# Patient Record
Sex: MALE
[De-identification: names, ages, dates, MRNs, and addresses within clinical notes are randomized per-mention and may not be internally consistent; named-entity substitution may affect disease eponyms.]

---

## 2019-01-13 ENCOUNTER — HOSPITAL ENCOUNTER (INPATIENT)
Dept: HOSPITAL 31 - C.ER | Age: 59
LOS: 12 days | Discharge: NURSING, REHAB OR LONG-TERM CARE | DRG: 550 | End: 2019-01-25
Attending: INTERNAL MEDICINE | Admitting: INTERNAL MEDICINE
Payer: MEDICAID

## 2019-01-13 DIAGNOSIS — E11.22: ICD-10-CM

## 2019-01-13 DIAGNOSIS — E87.2: ICD-10-CM

## 2019-01-13 DIAGNOSIS — N18.6: ICD-10-CM

## 2019-01-13 DIAGNOSIS — I16.1: ICD-10-CM

## 2019-01-13 DIAGNOSIS — E87.5: ICD-10-CM

## 2019-01-13 DIAGNOSIS — I13.2: Primary | ICD-10-CM

## 2019-01-13 DIAGNOSIS — I50.43: ICD-10-CM

## 2019-01-13 DIAGNOSIS — Z99.2: ICD-10-CM

## 2019-01-13 DIAGNOSIS — E66.01: ICD-10-CM

## 2019-01-13 DIAGNOSIS — N17.9: ICD-10-CM

## 2019-01-13 DIAGNOSIS — Z79.4: ICD-10-CM

## 2019-01-13 LAB
ALBUMIN SERPL-MCNC: 3.7 G/DL (ref 3.5–5)
ALBUMIN/GLOB SERPL: 1 {RATIO} (ref 1–2.1)
ALT SERPL-CCNC: 30 U/L (ref 21–72)
AST SERPL-CCNC: 66 U/L (ref 17–59)
BASOPHILS # BLD AUTO: 0 K/UL (ref 0–0.2)
BASOPHILS NFR BLD: 0.7 % (ref 0–2)
BUN SERPL-MCNC: 42 MG/DL (ref 9–20)
CALCIUM SERPL-MCNC: 8.2 MG/DL (ref 8.6–10.4)
CK MB SERPL-MCNC: 15.2 NG/ML (ref 0–3.38)
EOSINOPHIL # BLD AUTO: 0.1 K/UL (ref 0–0.7)
EOSINOPHIL NFR BLD: 1 % (ref 0–4)
ERYTHROCYTE [DISTWIDTH] IN BLOOD BY AUTOMATED COUNT: 14.5 % (ref 11.5–14.5)
GFR NON-AFRICAN AMERICAN: 11
HGB BLD-MCNC: 12.7 G/DL (ref 12–18)
LYMPHOCYTES # BLD AUTO: 0.8 K/UL (ref 1–4.3)
LYMPHOCYTES NFR BLD AUTO: 12.2 % (ref 20–40)
MCH RBC QN AUTO: 30.9 PG (ref 27–31)
MCHC RBC AUTO-ENTMCNC: 33.4 G/DL (ref 33–37)
MCV RBC AUTO: 92.4 FL (ref 80–94)
MONOCYTES # BLD: 0.5 K/UL (ref 0–0.8)
MONOCYTES NFR BLD: 8.3 % (ref 0–10)
NEUTROPHILS # BLD: 5 K/UL (ref 1.8–7)
NEUTROPHILS NFR BLD AUTO: 77.8 % (ref 50–75)
NRBC BLD AUTO-RTO: 0 % (ref 0–2)
PLATELET # BLD: 320 K/UL (ref 130–400)
PMV BLD AUTO: 7.9 FL (ref 7.2–11.7)
RBC # BLD AUTO: 4.12 MIL/UL (ref 4.4–5.9)
TROPONIN I SERPL-MCNC: 0.23 NG/ML (ref 0–0.12)
TROPONIN I SERPL-MCNC: 0.24 NG/ML (ref 0–0.12)
WBC # BLD AUTO: 6.4 K/UL (ref 4.8–10.8)

## 2019-01-13 RX ADMIN — INSULIN ASPART SCH: 100 INJECTION, SOLUTION INTRAVENOUS; SUBCUTANEOUS at 17:00

## 2019-01-13 RX ADMIN — INSULIN ASPART SCH: 100 INJECTION, SOLUTION INTRAVENOUS; SUBCUTANEOUS at 21:51

## 2019-01-13 NOTE — RAD
Date of service: 



01/13/2019



PROCEDURE:  CHEST RADIOGRAPH, 1 VIEW



HISTORY:

SOB



COMPARISON:

02/09/2016



FINDINGS:



LUNGS:





The lungs are well inflated and clear.  There is mild pulmonary 

venous congestion.  No focal consolidation. 



PLEURA:

Small right pleural effusion.  No large left pleural effusion.  No 

pneumothorax. 



CARDIOVASCULAR:

Moderate cardiomegaly.  No aortic atherosclerotic calcifications 

present. 



OSSEOUS STRUCTURES:

Within normal limits for the patient's age.



VISUALIZED UPPER ABDOMEN:

Normal.



OTHER FINDINGS:

None. 



IMPRESSION:

Small right pleural effusion.  Underlying airspace disease cannot be 

excluded.



Moderate cardiomegaly and mild pulmonary venous congestion.

## 2019-01-13 NOTE — C.PDOC
History Of Present Illness





58 year old male presents to the ED for evaluation of mild cough and shortness 

of breath for 3 days. The patient reports current symptoms are exacerbated when 

lying down flat or ambulating. Denies fever, chills, chest pain, and any other 

associated symptoms.  





Chief Complaint (Nursing): Shortness Of Breath


History Per: Patient


History/Exam Limitations: no limitations


Onset/Duration Of Symptoms: Days (x3)


Current Symptoms Are (Timing): Still Present


Associated Symptoms: denies: Fever, Chills


Recent travel outside of the [place]: No





Past Medical History


Reviewed: Historical Data, Nursing Documentation, Vital Signs


Vital Signs: 





                                Last Vital Signs











Temp  97.5 F L  19 11:47


 


Pulse  104 H  19 11:47


 


Resp  24   19 11:47


 


BP  207/118 H  19 11:47


 


Pulse Ox  96   19 11:47














- Medical History


PMH: Bronchitis, HTN


Family History: States: Unknown Family Hx





- Social History


Hx Alcohol Use: No


Hx Substance Use: No





- Immunization History


Hx Tetanus Toxoid Vaccination: No





Review Of Systems


Except As Marked, All Systems Reviewed And Found Negative.


Constitutional: Negative for: Fever, Chills


Eyes: Negative for: Vision Change


Cardiovascular: Negative for: Chest Pain, Palpitations


Respiratory: Positive for: Cough (mild. ), Shortness of Breath, Pleuritic Pain


Gastrointestinal: Negative for: Nausea, Vomiting


Musculoskeletal: Positive for: Other (Lower leg swelling)


Skin: Negative for: Rash


Neurological: Negative for: Weakness, Numbness, Headache





Physical Exam





- Physical Exam


Appears: Non-toxic, No Acute Distress


Skin: Normal Color, Warm, Dry


Head: Atraumatic, Normacephalic


Eye(s): bilateral: Normal Inspection


Oral Mucosa: Moist


Neck: Normal ROM, Supple


Chest: Symmetrical, No Deformity


Cardiovascular: Rhythm Regular, No Murmur


Respiratory: Normal Breath Sounds, No Rales, No Rhonchi, No Wheezing


Gastrointestinal/Abdominal: Normal Exam, Soft, No Tenderness


Extremity: Bilateral: Atraumatic, Normal Color And Temperature, Normal ROM, 

Other (2+ edema lower extremities.)


Neurological/Psych: Oriented x3, Normal Speech, Normal Cognition





ED Course And Treatment





- Laboratory Results


Result Diagrams: 


                                 19 12:13





                                 19 12:13


ECG: Interpreted By Me, Viewed By Me


ECG Rhythm: Sinus Rhythm


Interpretation Of ECG: borderline prolonged QT, no ST elevation


Rate From EC (bpm)


O2 Sat by Pulse Oximetry: 96 (RA)


Pulse Ox Interpretation: Normal





- Other Rad


  ** CXR


X-Ray: Viewed By Me, Read By Radiologist


Interpretation: FINDINGS:  LUNGS:  The lungs are well inflated and clear.  There

is mild pulmonary venous congestion.  No focal consolidation.  PLEURA:  Small 

right pleural effusion.  No large left pleural effusion.  No pneumothorax.  

CARDIOVASCULAR:  Moderate cardiomegaly.  No aortic atherosclerotic 

calcifications present.  OSSEOUS STRUCTURES:  Within normal limits for the pa

tient's age.  VISUALIZED UPPER ABDOMEN:  Normal.  OTHER FINDINGS:  None.  

IMPRESSION:  Small right pleural effusion.  Underlying airspace disease cannot 

be excluded.  Moderate cardiomegaly and mild pulmonary venous congestion.





Critical Care Time





- Critical Care Note


Total Time (in mins): 32


Documented critical care: time excludes all time spent performing seperately 

billable procedures.





Medical Decision Making


Medical Decision Making: 





Initial Plan: 


-Blood sent. 


-CXR 


-EKG 


-Lasix 


-Nitro-Bid -2% 





Imaging and labs reviewed.  +trop 0.2250, + [place] [address].





13:30


Case discussed with Dr. Avila, patient accepted for admission. 





Disposition


Discussed With : Kadeem Avila


Doctor Will See Patient In The: Hospital


Counseled Patient/Family Regarding: Studies Performed, Diagnosis





- Disposition


Disposition: HOSPITALIZED


Disposition Time: 13:30


Condition: GUARDED


Forms:  [facility] (English)





- Clinical Impression


Clinical Impression: 


 Respiratory distress, Heart failure, Hypertensive emergency








- Scribe Statement


The provider has reviewed the documentation as recorded by the Scribe (Kristine Maya)


Provider Attestation: 





All medical record entries made by the Scribe were at my direction and 

personally dictated by me. I have reviewed the chart and agree that the record 

accurately reflects my personal performance of the history, physical exam, 

medical decision making, and the department course for this patient. I have also

personally directed, reviewed, and agree with the discharge instructions and 

disposition.

## 2019-01-13 NOTE — CP.PCM.HP
Past Patient History





- Infectious Disease


Hx of Infectious Diseases: None





- Past Social History


Smoking Status: Never Smoked





- CARDIAC


Hx Hypertension: Yes





- PULMONARY


Hx Bronchitis: Yes





- ENDOCRINE/METABOLIC


Hx Diabetes Mellitus Type 1: Yes





- MUSCULOSKELETAL/RHEUMATOLOGICAL


Hx Falls: No (patient reported)





- PSYCHIATRIC


Hx Substance Use: No





- SURGICAL HISTORY


Hx Surgeries: No





- ANESTHESIA


Hx Anesthesia: No





Meds


Allergies/Adverse Reactions: 


                                    Allergies











Allergy/AdvReac Type Severity Reaction Status Date / Time


 


Sulfa (Sulfonamide AdvReac   Verified 01/13/19 11:52





Antibiotics)     














Results





- Vital Signs


Recent Vital Signs: 





                                Last Vital Signs











Temp  97.5 F L  01/13/19 11:47


 


Pulse  75   01/13/19 14:53


 


Resp  19   01/13/19 14:53


 


BP  168/102 H  01/13/19 14:53


 


Pulse Ox  98   01/13/19 14:53














- Labs


Result Diagrams: 


                                 01/13/19 12:13





                                 01/13/19 12:13


Labs: 





                         Laboratory Results - last 24 hr











  01/13/19 01/13/19





  12:13 12:13


 


WBC  6.4  D 


 


RBC  4.12 L 


 


Hgb  12.7  D 


 


Hct  38.1 


 


MCV  92.4  D 


 


MCH  30.9 


 


MCHC  33.4 


 


RDW  14.5 


 


Plt Count  320 


 


MPV  7.9 


 


Neut % (Auto)  77.8 H 


 


Lymph % (Auto)  12.2 L 


 


Mono % (Auto)  8.3 


 


Eos % (Auto)  1.0 


 


Baso % (Auto)  0.7 


 


Neut # (Auto)  5.0 


 


Lymph # (Auto)  0.8 L 


 


Mono # (Auto)  0.5 


 


Eos # (Auto)  0.1 


 


Baso # (Auto)  0.0 


 


Sodium   138


 


Potassium   5.7 H


 


Chloride   111 H


 


Carbon Dioxide   17 L


 


Anion Gap   16


 


BUN   42 H


 


Creatinine   5.2 H


 


Est GFR ( Amer)   14


 


Est GFR (Non-Af Amer)   11


 


Random Glucose   116 H D


 


Calcium   8.2 L


 


Total Bilirubin   1.0


 


AST   66 H


 


ALT   30


 


Alkaline Phosphatase   96


 


Troponin I   0.2250 H*


 


NT-Pro-B Natriuret Pep   27331 H


 


Total Protein   7.5


 


Albumin   3.7


 


Globulin   3.8


 


Albumin/Globulin Ratio   1.0

## 2019-01-14 LAB
BUN SERPL-MCNC: 51 MG/DL (ref 9–20)
C3 SERPL-MCNC: 80 MG/DL (ref 88–165)
C4 SERPL-MCNC: 40.7 MG/DL (ref 14–44)
CALCIUM SERPL-MCNC: 8.1 MG/DL (ref 8.6–10.4)
CK MB SERPL-MCNC: 15.3 NG/ML (ref 0–3.38)
GFR NON-AFRICAN AMERICAN: 11
HEPATITIS B CORE AB: NEGATIVE
HEPATITIS C ANTIBODY: NEGATIVE
TROPONIN I SERPL-MCNC: 0.25 NG/ML (ref 0–0.12)

## 2019-01-14 RX ADMIN — INSULIN ASPART SCH: 100 INJECTION, SOLUTION INTRAVENOUS; SUBCUTANEOUS at 11:32

## 2019-01-14 RX ADMIN — INSULIN ASPART SCH: 100 INJECTION, SOLUTION INTRAVENOUS; SUBCUTANEOUS at 07:16

## 2019-01-14 RX ADMIN — INSULIN ASPART SCH: 100 INJECTION, SOLUTION INTRAVENOUS; SUBCUTANEOUS at 21:10

## 2019-01-14 RX ADMIN — INSULIN ASPART SCH: 100 INJECTION, SOLUTION INTRAVENOUS; SUBCUTANEOUS at 16:30

## 2019-01-14 NOTE — CP.PCM.PN
Subjective





- Date & Time of Evaluation


Date of Evaluation: 01/14/19


Time of Evaluation: 17:57





Objective





- Vital Signs/Intake and Output


Vital Signs (last 24 hours): 


                                        











Temp Pulse Resp BP Pulse Ox


 


 97.7 F   68   20   155/83 H  98 


 


 01/14/19 15:23  01/14/19 16:51  01/14/19 15:23  01/14/19 15:23  01/14/19 15:23








Intake and Output: 


                                        











 01/14/19 01/14/19





 06:59 18:59


 


Intake Total 115 


 


Balance 115 














- Medications


Medications: 


                               Current Medications





Dextrose (Dextrose 50% Inj)  0 ml IV STAT PRN; Protocol


   PRN Reason: Hypoglycemia Protocol


Dextrose (Glutose 15)  0 gm PO ONCE PRN; Protocol


   PRN Reason: Hypoglycemia Protocol


Furosemide (Lasix)  80 mg IVP BID [facility]


Glucagon (Glucagen Diagnostic Kit)  0 mg IM STAT PRN; Protocol


   PRN Reason: Hypoglycemia Protocol


Heparin Sodium (Porcine) (Heparin)  5,000 units SC Q8 [facility]


   Last Admin: 01/14/19 13:38 Dose:  5,000 units


Hydralazine HCl (Apresoline)  50 mg PO Q6 [facility]


   Last Admin: 01/14/19 11:30 Dose:  50 mg


Dextrose (Dextrose 5% In Water 1000 Ml)  1,000 mls @ 0 mls/hr IV .Q0M PRN; 

Protocol


   PRN Reason: Hypoglycemia Protocol


Insulin Aspart (Novolog)  0 unit SC ACHS [facility]; Protocol


   Last Admin: 01/14/19 11:32 Dose:  Not Given


Lisinopril (Zestril)  30 mg PO DAILY [facility]


Metolazone (Zaroxolyn)  5 mg PO BID [facility]


   Stop: 01/19/19 18:01


Metoprolol Tartrate (Lopressor)  50 mg PO BID [facility]


   Last Admin: 01/14/19 09:57 Dose:  50 mg











- Labs


Labs: 


                                        





                                 01/13/19 12:13 





                                 01/14/19 11:33 











Assessment and Plan


(1) ESRD (end stage renal disease)


Status: Acute   





(2) Heart failure


Status: Acute   





(3) Diabetes


Status: Chronic   





(4) Hypertension


Status: Chronic   





(5) Hypertensive emergency


Status: Acute

## 2019-01-14 NOTE — US
Date of service: 01/14/2019



HISTORY:

BOBO. please assess for PVR as well



COMPARISON:

None available.



TECHNIQUE:

Pelvis ultrasound



FINDINGS:

Prevoid urinary bladder measures 6.8 x 5.5 x 6.3 cm, calculated 

volume 164.7 mL. 



Postvoid residual could not be obtained as patient unable to void 

during the examination. 



Images of the prostate gland were not provided. 



IMPRESSION:

Prevoid urinary bladder measures 6.8 x 5.5 x 6.3 cm, calculated 

volume 164.7 mL. 



Postvoid residual could not be obtained as patient unable to void 

during the examination.

## 2019-01-14 NOTE — US
Date of service: 01/14/2019



PROCEDURE:  Ultrasound of the Kidneys



HISTORY:

BOBO



COMPARISON:

None available.



TECHNIQUE:

Sonogram of the kidneys.



FINDINGS:



RIGHT KIDNEY:

Measures: 10.2 x 5.1 x 5.8 cm. 



Cortical thinning.  No hydronephrosis, obstructing calculus, or renal 

cyst identified. 



LEFT KIDNEY:

Measures: 11.8 x 5.5 x 5.3 cm. 



Cortical thinning.  No hydronephrosis, obstructing calculus, or renal 

cyst identified. 



OTHER FINDINGS:

None.



IMPRESSION:

Bilateral cortical thinning.  No evidence of hydronephrosis, 

obstructing calculus, or renal cyst.

## 2019-01-14 NOTE — CARD
--------------- APPROVED REPORT --------------





Date of service: 01/13/2019



EKG Measurement

Heart [id number]

[place] 152P

[id number]

[id number]

[id number]



<Conclusion>

Normal sinus rhythm

Left axis deviation

Prolonged QT

Abnormal ECG

## 2019-01-14 NOTE — CARD
--------------- APPROVED REPORT --------------





Date of service: 01/14/2019



EXAM: Two-dimensional and M-mode echocardiogram with Doppler and 

color Doppler.



Other Information 

Quality : AverageRhythm : NSR



INDICATION

Congestive Heart Failure 



RISK FACTORS

Hypertension 

Obesity   

Diabetes



2D DIMENSIONS 

IVSd0.9   (0.7-1.1cm)LVDd5.9   (3.9-5.9cm)

PWd1.4   (0.7-1.1cm)LA Kdjoft26   (18-58mL)

LVDs4.5   (2.5-4.0cm)FS (%) 23.2   %

LVEF (%)45.9   (>50%)



Aortic Valve

AoV Peak Xgnvtbhy483.9cm/Beltran Peak GR.11mmHg



Mitral Valve

MV E Jlismmjl42.6cm/sMV A Ripigywl66.8cm/sE/A ratio1.1



TDI

Lateral E' Peak V11.45cm/sMedial E' Peak V7.13cm/sE/Lateral E'6.1

E/Medial E'9.8



Tricuspid Valve

TR Peak Ntygwvjc077ga/sTR Peak Gr.35wtLpBLUE27rbXo



 LEFT VENTRICLE 

The Left Ventricle is mildly dilated. 

There is mild asymmetric left ventricular hypertrophy.

Left ventricle  is mildly impaired.The Ejection Fraction is 45-50%.

There is mild global hypokinesia.

The left ventricular diastolic function is normal.

No left ventricle thrombus noted on this study.



 RIGHT VENTRICLE 

The right ventricle is mildly dilated.

There is normal right ventricular wall thickness.

The right ventricular systolic function is normal.



 ATRIA 

The left atrium is mildly dilated.

The right atrium size is normal.



 AORTIC VALVE 

The aortic valve is mildly thickened.

The aortic valve is trileaflet.

No aortic regurgitation is present.

There is no aortic valvular stenosis.

There is no aortic valvular vegetation.



 MITRAL VALVE 

Mitral annular calcification is mild.

There is no evidence of mitral valve prolapse.

There is no mitral valve stenosis.

Mitral regurgitation is mild.



 TRICUSPID VALVE 

The tricuspid valve is normal in structure.

There is trace to mild tricuspid regurgitation.

Right ventricular systolic pressure is estimated at less than 30 

mmHg. 

There is no pulmonary hypertension.

There is no tricuspid valve prolapse or vegetation.

There is no tricuspid valve stenosis.



 PULMONIC VALVE 

The pulmonic valve is not well visualized.

There is no pulmonic valvular regurgitation.

There is no pulmonic valvular stenosis.



 GREAT VESSELS 

The aortic root is normal in size.

The IVC is normal in size and collapses >50% with inspiration.



 PERICARDIAL EFFUSION 

There is no pericardial effusion.

There is no pleural effusion.



<Conclusion>

The Left Ventricle is mildly dilated.

Left ventricular systolic function  is mildly impaired.The Ejection 

Fraction i -45%.

The left ventricular diastolic function is normal.

There is mild global hypokinesia.

The right ventricle is mildly dilated.

The left atrium is mildly dilated.

The right atrium size is normal.

Mitral regurgitation is mild.

There is trace to mild tricuspid regurgitation.

## 2019-01-14 NOTE — CP.PCM.CON
History of Present Illness





- History of Present Illness


History of Present Illness: 





Patient presents for SOB and COUGH/URI sx's and progressive swelling to legs 

over 3-4 weeks


Patient is a non smoker, no ETOH or drug abuse


He denies any prior hx of cardiac problems


Only remote surgery was amputation of L. foot small toe for infxn many years ago





He is mildly active. mostly indoors, obese


No CP, fevers, chills, N/V, or abdominal pain





In 2016 patient had similar sx's and was dx'd with


   Sputum positive for beta hemolytic strep.


   AFB Negative.


   Cavitary lung lesions were reported/bronchospy was refused and outpatient f/u

was suggested at the time





Review of Systems





- Review of Systems


All systems: reviewed and no additional remarkable complaints except





Past Patient History





- Infectious Disease


Hx of Infectious Diseases: None





- Past Social History


Smoking Status: Never Smoked





- CARDIAC


Hx Hypertension: Yes





- PULMONARY


Hx Bronchitis: Yes





- NEUROLOGICAL


Hx Neurological Disorder: No





- HEENT


Hx HEENT Problems: No





- RENAL


Hx Chronic Kidney Disease: No





- ENDOCRINE/METABOLIC


Hx Diabetes Mellitus Type 1: Yes





- HEMATOLOGICAL/ONCOLOGICAL


Hx Blood Transfusions: No





- INTEGUMENTARY


Hx Dermatological Problems: No





- MUSCULOSKELETAL/RHEUMATOLOGICAL


Hx Falls: No





- GASTROINTESTINAL


Hx Gastrointestinal Disorders: No





- GENITOURINARY/GYNECOLOGICAL


Hx Genitourinary Disorders: No





- PSYCHIATRIC


Hx Substance Use: No





- SURGICAL HISTORY


Hx Surgeries: No





- ANESTHESIA


Hx Anesthesia: No


Hx Anesthesia Reactions: No


Hx Malignant Hyperthermia: No


Has any member of the family had a problem w/ anesthesia?: No





Meds


Allergies/Adverse Reactions: 


                                    Allergies











Allergy/AdvReac Type Severity Reaction Status Date / Time


 


Sulfa (Sulfonamide AdvReac   Verified 01/13/19 11:52





Antibiotics)     














- Medications


Medications: 


                               Current Medications





Dextrose (Dextrose 50% Inj)  0 ml IV STAT PRN; Protocol


   PRN Reason: Hypoglycemia Protocol


Dextrose (Glutose 15)  0 gm PO ONCE PRN; Protocol


   PRN Reason: Hypoglycemia Protocol


Furosemide (Lasix)  40 mg IVP Q12 [facility]


   Last Admin: 01/14/19 09:57 Dose:  40 mg


Glucagon (Glucagen Diagnostic Kit)  0 mg IM STAT PRN; Protocol


   PRN Reason: Hypoglycemia Protocol


Heparin Sodium (Porcine) (Heparin)  5,000 units SC Q8 [facility]


Hydralazine HCl (Apresoline)  50 mg PO Q6 [facility]


Dextrose (Dextrose 5% In Water 1000 Ml)  1,000 mls @ 0 mls/hr IV .Q0M PRN; 

Protocol


   PRN Reason: Hypoglycemia Protocol


Insulin Aspart (Novolog)  0 unit SC ACHS [facility]; Protocol


   Last Admin: 01/14/19 07:16 Dose:  Not Given


Lisinopril (Zestril)  30 mg PO DAILY [facility]


Metoprolol Tartrate (Lopressor)  50 mg PO BID [facility]


   Last Admin: 01/14/19 09:57 Dose:  50 mg











Physical Exam





- Constitutional


Appears: Non-toxic, No Acute Distress





- Head Exam


Head Exam: ATRAUMATIC, NORMAL INSPECTION, NORMOCEPHALIC





- Eye Exam


Eye Exam: EOMI.  absent: Scleral icterus





- ENT Exam


ENT Exam: Mucous Membranes Moist, Normal Exam, Normal Oropharynx





- Neck Exam


Neck exam: Positive for: Normal Inspection.  Negative for: Tenderness, 

Thyromegaly





- Respiratory Exam


Respiratory Exam: Clear to Auscultation Bilateral, NORMAL BREATHING PATTERN.  

absent: Rales, Rhonchi, Wheezes





- Cardiovascular Exam


Cardiovascular Exam: RRR, +S1, +S2.  absent: JVD, +S4, Systolic Murmur





- GI/Abdominal Exam


GI & Abdominal Exam: Normal Bowel Sounds, Soft (obese).  absent: Tenderness





- Extremities Exam


Extremities exam: Negative for: calf tenderness (+ edema in both legs 1-2+ up to

knee)





- Back Exam


Back exam: absent: CVA tenderness (L), CVA tenderness (R)





- Neurological Exam


Neurological exam: Alert, Oriented x3





- Psychiatric Exam


Psychiatric exam: Normal Affect, Normal Mood





- Skin


Skin Exam: Normal Color, Warm





Results





- Vital Signs


Recent Vital Signs: 


                                Last Vital Signs











Temp  97.7 F   01/14/19 07:00


 


Pulse  74   01/14/19 08:00


 


Resp  18   01/14/19 07:00


 


BP  168/83 H  01/14/19 09:57


 


Pulse Ox  99   01/14/19 07:00














- Labs


Result Diagrams: 


                                 01/13/19 12:13





                                 01/14/19 11:33


Labs: 


                         Laboratory Results - last 24 hr











  01/13/19 01/13/19 01/13/19





  12:13 12:13 16:57


 


WBC  6.4  D  


 


RBC  4.12 L  


 


Hgb  12.7  D  


 


Hct  38.1  


 


MCV  92.4  D  


 


MCH  30.9  


 


MCHC  33.4  


 


RDW  14.5  


 


Plt Count  320  


 


MPV  7.9  


 


Neut % (Auto)  77.8 H  


 


Lymph % (Auto)  12.2 L  


 


Mono % (Auto)  8.3  


 


Eos % (Auto)  1.0  


 


Baso % (Auto)  0.7  


 


Neut # (Auto)  5.0  


 


Lymph # (Auto)  0.8 L  


 


Mono # (Auto)  0.5  


 


Eos # (Auto)  0.1  


 


Baso # (Auto)  0.0  


 


Sodium   138 


 


Potassium   5.7 H 


 


Chloride   111 H 


 


Carbon Dioxide   17 L 


 


Anion Gap   16 


 


BUN   42 H 


 


Creatinine   5.2 H 


 


Est GFR ( Amer)   14 


 


Est GFR (Non-Af Amer)   11 


 


POC Glucose (mg/dL)    101


 


Random Glucose   116 H D 


 


Calcium   8.2 L 


 


Total Bilirubin   1.0 


 


AST   66 H 


 


ALT   30 


 


Alkaline Phosphatase   96 


 


Total Creatine Kinase   


 


CK-MB (Mass)   


 


Troponin I   0.2250 H* 


 


NT-Pro-B Natriuret Pep   72746 H 


 


Total Protein   7.5 


 


Albumin   3.7 


 


Globulin   3.8 


 


Albumin/Globulin Ratio   1.0 














  01/13/19 01/13/19 01/14/19





  18:51 21:29 02:00


 


WBC   


 


RBC   


 


Hgb   


 


Hct   


 


MCV   


 


MCH   


 


MCHC   


 


RDW   


 


Plt Count   


 


MPV   


 


Neut % (Auto)   


 


Lymph % (Auto)   


 


Mono % (Auto)   


 


Eos % (Auto)   


 


Baso % (Auto)   


 


Neut # (Auto)   


 


Lymph # (Auto)   


 


Mono # (Auto)   


 


Eos # (Auto)   


 


Baso # (Auto)   


 


Sodium   


 


Potassium   


 


Chloride   


 


Carbon Dioxide   


 


Anion Gap   


 


BUN   


 


Creatinine   


 


Est GFR ( Amer)   


 


Est GFR (Non-Af Amer)   


 


POC Glucose (mg/dL)   95 


 


Random Glucose   


 


Calcium   


 


Total Bilirubin   


 


AST   


 


ALT   


 


Alkaline Phosphatase   


 


Total Creatine Kinase  809 H   807 H


 


CK-MB (Mass)  15.2 H   15.3 H


 


Troponin I  0.2400 H*   0.2480 H*


 


NT-Pro-B Natriuret Pep   


 


Total Protein   


 


Albumin   


 


Globulin   


 


Albumin/Globulin Ratio   














  01/14/19 01/14/19





  02:16 06:54


 


WBC  


 


RBC  


 


Hgb  


 


Hct  


 


MCV  


 


MCH  


 


MCHC  


 


RDW  


 


Plt Count  


 


MPV  


 


Neut % (Auto)  


 


Lymph % (Auto)  


 


Mono % (Auto)  


 


Eos % (Auto)  


 


Baso % (Auto)  


 


Neut # (Auto)  


 


Lymph # (Auto)  


 


Mono # (Auto)  


 


Eos # (Auto)  


 


Baso # (Auto)  


 


Sodium  


 


Potassium  


 


Chloride  


 


Carbon Dioxide  


 


Anion Gap  


 


BUN  


 


Creatinine  


 


Est GFR ( Amer)  


 


Est GFR (Non-Af Amer)  


 


POC Glucose (mg/dL)  82  78


 


Random Glucose  


 


Calcium  


 


Total Bilirubin  


 


AST  


 


ALT  


 


Alkaline Phosphatase  


 


Total Creatine Kinase  


 


CK-MB (Mass)  


 


Troponin I  


 


NT-Pro-B Natriuret Pep  


 


Total Protein  


 


Albumin  


 


Globulin  


 


Albumin/Globulin Ratio  














- EKG Data


EKG Interpreted by: Myself





- Imaging and Cardiology


  ** Chest x-ray


Status: Image reviewed by me





Assessment & Plan





- Assessment and Plan (Free Text)


Assessment: 





EKG: NSR, L. axis, non-specific ST/T changes


CXR: CM, small right pleural effusion, mild congestion


labs: 


   BOBO/CKD (creat 1.6 in 2016 now 5.2) with hyperkalemia


   Trop 0.22 > 0.24 > 0.24


ECHO: 1/14/18: Normal LVEF and wall motion, mild-mod LVH, Grade 2 DD, no 

significant PULM HTN or Valvular abnormalities, no pericardial effusion.





Meds:





HTN: uncontrolled


Diastolic dysfunction, normal systolic function


   Hydralazine HCl (Apresoline)  50 mg PO Q6 [facility] 


   Lisinopril (Zestril)  30 mg PO DAILY [facility]


   Metoprolol Tartrate (Lopressor)  50 mg PO BID [facility]


   may need clonidine or minoxidil in addition if BP remains high





BOBO/CKD/hyperkalemia


   agree with renal eval and w/u: lasic inc to 80 IV BID and metolazone 5 BID 

added


   mild inc troponin is c/w CKD


   f/u renal recc


   I do not suspect ACS


   Monitor and correct lytes

## 2019-01-14 NOTE — CP.PCM.CON
History of Present Illness





- History of Present Illness


History of Present Illness: 


Nephrology Consultation Note:





Assessment: critical


Acute Kidney Injury (N17.9) ? etiology. eval for GN such as ANCA


mild hyerkalemia and metabolic acidosis


fluid overload, possible CHF exacerbation


Diabetic chronic Kidney Disease (E11.22) 


Hypertensive Chronic Kidney Disease (I12.9)


Chronic Kidney Disease (N18.3) Stage 3 with? mg proteinuria (R80.9) likely due 

to HTN


morbid obesity


hx of cavitary lung lesions





Plan


No acute need for renal replacement therapy at this time but may need soon and 

will need close follow up. 


Hypertension control with meds as ordered. Maintain hemodynamics stable. Avoid 

hypotension. Patient not on ACEI/ARB due to recent BOBO


Monitor Input/Output, daily weights and renal function with basic metabolic 

panel


diuresis with IV lasix 80 bid and metolazone 5 mg bid


cardiology pulmonary following





Check urine analysis, spot protein/creatinine, albumin/creatinine ratio, renal 

and bladder sonogram


Check GN work up as C3, C4, TANYA, Anti dsDNA, ANCA (MPO and NC-3), Anti GBM 

antibody, HIV/Hep B and Hep C serology 


serum protein electrophoresis with immunofixation, serum free light chain assay 

(Kappa/Lambda)


Check for 25-OH vitamin D, iPTH, phosphorus level. 





Dose meds/antibiotics for reduced GFR. Avoid fleets enema/magnesium based 

laxatives. Avoid nephrotoxins/NSAIDs/ iodinated contrast (unless needed 

emergently)


Glycemic control


Further work up/management as per primary team





Thanks for allowing me to participate in care of your patient. Will follow 

patient with you. Please call if any Qs. had d/w team


Dr Anton Mcdaniel


Office: [phone] Cell: [phone] Fax: [phone]





Chief Complaint; SOB


Reason for consult: Acute Kidney Injury


HPI: Pt is a 58 M with hx of diabetes Mellitus ( 5years), hypertension (recent 

years) morbid obesity no regular medical follow up presented with complaints of 

worsening SOB on minimal exertion and on lying down. reported leg swelling. 

symptoms only for last few days as per pt. his serum cr 1.5-1.6 in 2016 also 

with cavitary lesion in lung that time. now cr 5 range


Denies OTC/herbal meds or NSAIDs


No recent iodinated contrast exposure. No obvious episodes of low BP. bP high in

200s when came


denies smoking/etoh or drugs





ROS: 


Cardiovascular: No chest pain. 


Pulmonary: c/o shortness of breath 


Gastrointestinal: denies abdominal pain No nausea. No vomiting. 


Genitourinary: No pain while urinating. Denies blood in urine. 


All other negative except as mentioned in HPI





Physical Examination:      


General Appearance: Comfortable, in no acute respiratory distress, co-operative 

. morbid obese


Vitals reviewed and noted as below


Head; Atraumatic, normocephalic


ENT: no ulcers no thrush. Tongue is midline. Oropharynx: no rash or ulcers.


EYES: Pupils are equal, round and reactive to light accommodation. Eye muscles 

and extraocular movement intact. Sclera is anicteric.


Neck; supple no lymphadenopathy, no thyromegaly or bruit


Lungs: Normal respiratory rate/effort. Breath sounds bilateral reduced at bases 

with crackles ++


Heart: Normal rate. s1s2 normal. No rub or gallop. 


Extremities: 3+ edema. No varicose veins


Neurological: Patient is alert, awake and oriented to person, place and time. No

focal deficit. Strength bilateral appropriate and equal


Skin: Warm and dry. Normal turgor. No rash. Palpitation: Normal elasticity for 

age


Abdomen: Abdomen is soft. Bowel sounds +. There is no abdominal tenderness, no 

guarding/rigidity no organomegaly


Psych: normal insight and normal affect/mood


MSK: no joint tenderness or swelling. Digits and nails normal, no deformity


: kidney or bladder not palpable





Labs/imaging reviewed.


Past medical history, past surgical history, family history, social history, 

allergy reviewed and noted as below


Family hx: no hx of CKD. Rest non-contributory 














Past Patient History





- Infectious Disease


Hx of Infectious Diseases: None





- Past Social History


Smoking Status: Never Smoked





- CARDIAC


Hx Hypertension: Yes





- PULMONARY


Hx Bronchitis: Yes





- NEUROLOGICAL


Hx Neurological Disorder: No





- HEENT


Hx HEENT Problems: No





- RENAL


Hx Chronic Kidney Disease: No





- ENDOCRINE/METABOLIC


Hx Diabetes Mellitus Type 1: Yes





- HEMATOLOGICAL/ONCOLOGICAL


Hx Blood Transfusions: No





- INTEGUMENTARY


Hx Dermatological Problems: No





- MUSCULOSKELETAL/RHEUMATOLOGICAL


Hx Falls: No





- GASTROINTESTINAL


Hx Gastrointestinal Disorders: No





- GENITOURINARY/GYNECOLOGICAL


Hx Genitourinary Disorders: No





- PSYCHIATRIC


Hx Substance Use: No





- SURGICAL HISTORY


Hx Surgeries: No





- ANESTHESIA


Hx Anesthesia: No


Hx Anesthesia Reactions: No


Hx Malignant Hyperthermia: No


Has any member of the family had a problem w/ anesthesia?: No





Meds


Allergies/Adverse Reactions: 


                                    Allergies











Allergy/AdvReac Type Severity Reaction Status Date / Time


 


Sulfa (Sulfonamide AdvReac   Verified 01/13/19 11:52





Antibiotics)     














- Medications


Medications: 


                               Current Medications





Dextrose (Dextrose 50% Inj)  0 ml IV STAT PRN; Protocol


   PRN Reason: Hypoglycemia Protocol


Dextrose (Glutose 15)  0 gm PO ONCE PRN; Protocol


   PRN Reason: Hypoglycemia Protocol


Furosemide (Lasix)  40 mg IVP Q12 [facility]


   Last Admin: 01/14/19 09:57 Dose:  40 mg


Glucagon (Glucagen Diagnostic Kit)  0 mg IM STAT PRN; Protocol


   PRN Reason: Hypoglycemia Protocol


Heparin Sodium (Porcine) (Heparin)  5,000 units SC Q8 [facility]


   Last Admin: 01/14/19 13:38 Dose:  5,000 units


Hydralazine HCl (Apresoline)  50 mg PO Q6 [facility]


   Last Admin: 01/14/19 11:30 Dose:  50 mg


Dextrose (Dextrose 5% In Water 1000 Ml)  1,000 mls @ 0 mls/hr IV .Q0M PRN; 

Protocol


   PRN Reason: Hypoglycemia Protocol


Insulin Aspart (Novolog)  0 unit SC ACHS [facility]; Protocol


   Last Admin: 01/14/19 11:32 Dose:  Not Given


Lisinopril (Zestril)  30 mg PO DAILY [facility]


Metoprolol Tartrate (Lopressor)  50 mg PO BID [facility]


   Last Admin: 01/14/19 09:57 Dose:  50 mg











Results





- Vital Signs


Recent Vital Signs: 


                                Last Vital Signs











Temp  97.7 F   01/14/19 07:00


 


Pulse  74   01/14/19 08:00


 


Resp  18   01/14/19 07:00


 


BP  168/83 H  01/14/19 09:57


 


Pulse Ox  99   01/14/19 07:00














- Labs


Result Diagrams: 


                                 01/13/19 12:13





                                 01/14/19 11:33


Labs: 


                         Laboratory Results - last 24 hr











  01/13/19 01/13/19 01/13/19





  16:57 18:51 21:29


 


Sodium   


 


Potassium   


 


Chloride   


 


Carbon Dioxide   


 


Anion Gap   


 


BUN   


 


Creatinine   


 


Est GFR ( Amer)   


 


Est GFR (Non-Af Amer)   


 


POC Glucose (mg/dL)  101   95


 


Random Glucose   


 


Calcium   


 


Total Creatine Kinase   809 H 


 


CK-MB (Mass)   15.2 H 


 


Troponin I   0.2400 H* 














  01/14/19 01/14/19 01/14/19





  02:00 02:16 06:54


 


Sodium   


 


Potassium   


 


Chloride   


 


Carbon Dioxide   


 


Anion Gap   


 


BUN   


 


Creatinine   


 


Est GFR ( Amer)   


 


Est GFR (Non-Af Amer)   


 


POC Glucose (mg/dL)   82  78


 


Random Glucose   


 


Calcium   


 


Total Creatine Kinase  807 H  


 


CK-MB (Mass)  15.3 H  


 


Troponin I  0.2480 H*  














  01/14/19 01/14/19





  11:31 11:33


 


Sodium   139


 


Potassium   4.9


 


Chloride   110 H


 


Carbon Dioxide   21 L


 


Anion Gap   13


 


BUN   51 H


 


Creatinine   5.2 H


 


Est GFR ( Amer)   14


 


Est GFR (Non-Af Amer)   11


 


POC Glucose (mg/dL)  116 H 


 


Random Glucose   99


 


Calcium   8.1 L


 


Total Creatine Kinase  


 


CK-MB (Mass)  


 


Troponin I

## 2019-01-15 LAB
ALBUMIN SERPL-MCNC: 3 G/DL (ref 3.5–5)
ALBUMIN/GLOB SERPL: 1.1 {RATIO} (ref 1–2.1)
ALT SERPL-CCNC: 41 U/L (ref 21–72)
AST SERPL-CCNC: 62 U/L (ref 17–59)
BACTERIA #/AREA URNS HPF: (no result) /[HPF]
BASOPHILS # BLD AUTO: 0.1 K/UL (ref 0–0.2)
BASOPHILS NFR BLD: 0.7 % (ref 0–2)
BILIRUB UR-MCNC: NEGATIVE MG/DL
BUN SERPL-MCNC: 54 MG/DL (ref 9–20)
CALCIUM SERPL-MCNC: 7.8 MG/DL (ref 8.6–10.4)
EOSINOPHIL # BLD AUTO: 0.5 K/UL (ref 0–0.7)
EOSINOPHIL NFR BLD: 7.3 % (ref 0–4)
ERYTHROCYTE [DISTWIDTH] IN BLOOD BY AUTOMATED COUNT: 14.1 % (ref 11.5–14.5)
GFR NON-AFRICAN AMERICAN: 9
GLUCOSE UR STRIP-MCNC: (no result) MG/DL
GRAN CASTS #/AREA URNS LPF: 6 /LPF (ref 0–1)
HGB BLD-MCNC: 10.9 G/DL (ref 12–18)
HYALINE CASTS #/AREA URNS LPF: (no result) /LPF (ref 0–2)
LEUKOCYTE ESTERASE UR-ACNC: (no result) LEU/UL
LYMPHOCYTES # BLD AUTO: 1.7 K/UL (ref 1–4.3)
LYMPHOCYTES NFR BLD AUTO: 24.2 % (ref 20–40)
MCH RBC QN AUTO: 30.1 PG (ref 27–31)
MCHC RBC AUTO-ENTMCNC: 32.3 G/DL (ref 33–37)
MCV RBC AUTO: 93.2 FL (ref 80–94)
MONOCYTES # BLD: 1.1 K/UL (ref 0–0.8)
MONOCYTES NFR BLD: 15.9 % (ref 0–10)
NEUTROPHILS # BLD: 3.6 K/UL (ref 1.8–7)
NEUTROPHILS NFR BLD AUTO: 51.9 % (ref 50–75)
NRBC BLD AUTO-RTO: 0 % (ref 0–2)
PH UR STRIP: 5 [PH] (ref 5–8)
PLATELET # BLD: 281 K/UL (ref 130–400)
PMV BLD AUTO: 7.9 FL (ref 7.2–11.7)
PROT UR STRIP-MCNC: (no result) MG/DL
RBC # BLD AUTO: 3.63 MIL/UL (ref 4.4–5.9)
RBC # UR STRIP: (no result) /UL
SP GR UR STRIP: 1.01 (ref 1–1.03)
SQUAMOUS EPITHIAL: 3 /HPF (ref 0–5)
URINE AMORPHOUS SEDIMENT: (no result) /UL
UROBILINOGEN UR-MCNC: NORMAL MG/DL (ref 0.2–1)
WBC # BLD AUTO: 6.9 K/UL (ref 4.8–10.8)

## 2019-01-15 PROCEDURE — 02HV33Z INSERTION OF INFUSION DEVICE INTO SUPERIOR VENA CAVA, PERCUTANEOUS APPROACH: ICD-10-PCS | Performed by: SURGERY

## 2019-01-15 RX ADMIN — INSULIN ASPART SCH: 100 INJECTION, SOLUTION INTRAVENOUS; SUBCUTANEOUS at 23:00

## 2019-01-15 RX ADMIN — ERGOCALCIFEROL SCH CAP: 1.25 CAPSULE ORAL at 12:18

## 2019-01-15 RX ADMIN — INSULIN ASPART SCH: 100 INJECTION, SOLUTION INTRAVENOUS; SUBCUTANEOUS at 08:00

## 2019-01-15 RX ADMIN — INSULIN ASPART SCH: 100 INJECTION, SOLUTION INTRAVENOUS; SUBCUTANEOUS at 18:04

## 2019-01-15 RX ADMIN — INSULIN ASPART SCH: 100 INJECTION, SOLUTION INTRAVENOUS; SUBCUTANEOUS at 12:23

## 2019-01-15 NOTE — RAD
HISTORY:

 s/p permacath 



COMPARISON:

Chest x-ray performed 1/13/19 



TECHNIQUE:

Chest, one view.



FINDINGS:

Examination limited by habitus.  



Right IJ approach dialysis catheter extends to the right atrium/SVC.



LUNGS:

Mild pulmonary venous congestion. 



PLEURA:

No significant pleural effusion identified. No definite pneumothorax .



CARDIOVASCULAR:

Cardiomegaly.  No significant atherosclerotic calcification present. 



OSSEOUS STRUCTURES:

Degenerative changes.



VISUALIZED UPPER ABDOMEN:

Unremarkable.



OTHER FINDINGS:

None.



IMPRESSION:

Right IJ approach dialysis catheter extends to the right atrium/SVC.



Cardiomegaly.  



Mild pulmonary venous congestion.

## 2019-01-15 NOTE — CP.PCM.PN
Subjective





- Date & Time of Evaluation


Date of Evaluation: 01/15/19


Time of Evaluation: 15:56





Objective





- Vital Signs/Intake and Output


Vital Signs (last 24 hours): 


                                        











Temp Pulse Resp BP Pulse Ox


 


 97.6 F   70   18   158/77 H  99 


 


 01/15/19 07:00  01/15/19 07:00  01/15/19 07:00  01/15/19 10:41  01/15/19 07:00








Intake and Output: 


                                        











 01/15/19 01/15/19





 06:59 18:59


 


Intake Total 580 


 


Output Total 400 


 


Balance 180 














- Medications


Medications: 


                               Current Medications





Dextrose (Dextrose 50% Inj)  0 ml IV STAT PRN; Protocol


   PRN Reason: Hypoglycemia Protocol


Dextrose (Glutose 15)  0 gm PO ONCE PRN; Protocol


   PRN Reason: Hypoglycemia Protocol


Ergocalciferol (Drisdol 50,000 Intl Units Cap)  1 cap PO Q7D [facility]


   Last Admin: 01/15/19 12:18 Dose:  1 cap


Furosemide (Lasix)  80 mg IVP BID [facility]


   Last Admin: 01/15/19 10:41 Dose:  80 mg


Glucagon (Glucagen Diagnostic Kit)  0 mg IM STAT PRN; Protocol


   PRN Reason: Hypoglycemia Protocol


Heparin Sodium (Porcine) (Heparin)  5,000 units SC Q8 [facility]


   Last Admin: 01/15/19 14:25 Dose:  Not Given


Hydralazine HCl (Apresoline)  50 mg PO Q6 [facility]


   Last Admin: 01/15/19 12:18 Dose:  50 mg


Dextrose (Dextrose 5% In Water 1000 Ml)  1,000 mls @ 0 mls/hr IV .Q0M PRN; 

Protocol


   PRN Reason: Hypoglycemia Protocol


Insulin Aspart (Novolog)  0 unit SC ACHS [facility]; Protocol


   Last Admin: 01/15/19 12:23 Dose:  Not Given


Lisinopril (Zestril)  30 mg PO DAILY [facility]


Metolazone (Zaroxolyn)  5 mg PO BID [facility]


   Stop: 01/19/19 18:01


   Last Admin: 01/15/19 10:42 Dose:  5 mg


Metoprolol Tartrate (Lopressor)  50 mg PO BID [facility]


   Last Admin: 01/15/19 10:42 Dose:  50 mg


Morphine Sulfate (Morphine)  1 mg IVP Q10M PRN


   PRN Reason: Pain, moderate (4-7)


   Stop: 01/15/19 17:14


Sevelamer Carbonate (Renvela)  800 mg PO TIDCC [facility]


   Last Admin: 01/15/19 12:21 Dose:  800 mg


Tamsulosin HCl (Flomax)  0.4 mg PO DAILY [facility]


   Last Admin: 01/15/19 12:18 Dose:  0.4 mg


Vitamin B Complex/Vit C/Folic Acid (Nephro-Roxy)  1 tab PO 0800 [facility]











- Labs


Labs: 


                                        





                                 01/15/19 06:36 





                                 01/15/19 06:36 











Assessment and Plan


(1) ESRD (end stage renal disease)


Status: Acute   





(2) Heart failure


Status: Acute   





(3) Diabetes


Status: Chronic   





(4) Hypertension


Status: Chronic   





(5) Hypertensive emergency


Status: Acute

## 2019-01-15 NOTE — PCM.SURG1
Surgeon's Initial Post Op Note





- Surgeon's Notes


Surgeon: Dr. Menchaca


Assistant: Dr. Martínez 


Type of Anesthesia: Local


Pre-Operative Diagnosis: ESRD


Operative Findings: see operative note


Post-Operative Diagnosis: same


Operation Performed: Right IJ permacath insertion


Specimen/Specimens Removed: none


Estimated Blood Loss: EBL {In ML}: 15


Blood Products Given: N/A


Drains Used: No Drains


Post-Op Condition: Good


Date of Surgery/Procedure: 01/15/19


Time of Surgery/Procedure: 16:33

## 2019-01-15 NOTE — CP.PCM.CON
History of Present Illness





- History of Present Illness


History of Present Illness: 





Surgery Consult Note for Dr. Menchaca





CC: SOB


58M w/ PMHx of HTN, Diabetes, CHF, bronchitis presented to ED for worsening of 

SOB. Patient states he has had worsening of SOB since last Wednesday. Patient 

states he also has cold like symptoms including chills (no fevers), runny nose, 

sore throat, and cough. Patient states he is unable to walk around the house as 

he normally was able to. Additionally patient attests to lower extremity edema 

for 2 weeks. Patient denies recent travel. Patient denies chest pain, headaches,

vision changes, abdominal pain, nausea, vomiting. patient does state he has had 

increased urinary frequency but denies dysuria and hematuria. 


Per nephro, patient may need HD as patient remains oliguric despite diuretics 

challenge along with worsening BOBO. Vascular surgery consulted for HD access. 





PMHx: CHF, HTN, Diabetes, bronchitis


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


SocialHx: denies smoking; EtOH


Allergies: Sulfa











Review of Systems





- Constitutional


Constitutional: Chills.  absent: Headache, Night Sweats





- EENT


Eyes: absent: Change in Vision, Diplopia, Discharge


Ears: absent: Ear Discharge, Ear Pain


Nose/Mouth/Throat: Sore Throat.  absent: Nasal Trauma, Dysphagia, Mouth Pain, 

Neck Pain





- Cardiovascular


Cardiovascular: absent: Chest Pain





- Respiratory


Respiratory: Cough, Dyspnea, Dyspnea on Exertion





- Gastrointestinal


Gastrointestinal: absent: Abdominal Pain, Cramping, Diarrhea, Loose Stools





- Genitourinary


Genitourinary: Urinary Frequency.  absent: Flank Pain, Urinary Hesitance, 

Bladder Distension





- Musculoskeletal


Musculoskeletal: absent: Arthralgias, Muscle Weakness, Stiffness





- Integumentary


Integumentary: absent: Lesions, New Lesions





- Neurological


Neurological: absent: Headaches, Syncope, Tingling





- Psychiatric


Psychiatric: absent: Change in Appetite, Confusion





- Endocrine


Endocrine: Polyuria.  absent: Polydipsia, Polyphagia





Past Patient History





- Infectious Disease


Hx of Infectious Diseases: None





- Past Social History


Smoking Status: Never Smoked





- CARDIAC


Hx Hypertension: Yes





- PULMONARY


Hx Bronchitis: Yes





- NEUROLOGICAL


Hx Neurological Disorder: No





- HEENT


Hx HEENT Problems: No





- RENAL


Hx Chronic Kidney Disease: No





- ENDOCRINE/METABOLIC


Hx Diabetes Mellitus Type 1: Yes





- HEMATOLOGICAL/ONCOLOGICAL


Hx Blood Transfusions: No





- INTEGUMENTARY


Hx Dermatological Problems: No





- MUSCULOSKELETAL/RHEUMATOLOGICAL


Hx Falls: No





- GASTROINTESTINAL


Hx Gastrointestinal Disorders: No





- GENITOURINARY/GYNECOLOGICAL


Hx Genitourinary Disorders: No





- PSYCHIATRIC


Hx Substance Use: No





- SURGICAL HISTORY


Hx Surgeries: No





- ANESTHESIA


Hx Anesthesia: No


Hx Anesthesia Reactions: No


Hx Malignant Hyperthermia: No


Has any member of the family had a problem w/ anesthesia?: No





Meds


Allergies/Adverse Reactions: 


                                    Allergies











Allergy/AdvReac Type Severity Reaction Status Date / Time


 


Sulfa (Sulfonamide AdvReac   Verified 01/13/19 11:52





Antibiotics)     














- Medications


Medications: 


                               Current Medications





Dextrose (Dextrose 50% Inj)  0 ml IV STAT PRN; Protocol


   PRN Reason: Hypoglycemia Protocol


Dextrose (Glutose 15)  0 gm PO ONCE PRN; Protocol


   PRN Reason: Hypoglycemia Protocol


Ergocalciferol (Drisdol 50,000 Intl Units Cap)  1 cap PO Q7D [facility]


   Last Admin: 01/15/19 12:18 Dose:  1 cap


Furosemide (Lasix)  80 mg IVP BID [facility]


   Last Admin: 01/15/19 10:41 Dose:  80 mg


Glucagon (Glucagen Diagnostic Kit)  0 mg IM STAT PRN; Protocol


   PRN Reason: Hypoglycemia Protocol


Heparin Sodium (Porcine) (Heparin)  5,000 units SC Q8 [facility]


   Last Admin: 01/15/19 05:53 Dose:  5,000 units


Hydralazine HCl (Apresoline)  50 mg PO Q6 [facility]


   Last Admin: 01/15/19 12:18 Dose:  50 mg


Dextrose (Dextrose 5% In Water 1000 Ml)  1,000 mls @ 0 mls/hr IV .Q0M PRN; 

Protocol


   PRN Reason: Hypoglycemia Protocol


Insulin Aspart (Novolog)  0 unit SC ACHS [facility]; Protocol


   Last Admin: 01/15/19 12:23 Dose:  Not Given


Lisinopril (Zestril)  30 mg PO DAILY [facility]


Metolazone (Zaroxolyn)  5 mg PO BID [facility]


   Stop: 01/19/19 18:01


   Last Admin: 01/15/19 10:42 Dose:  5 mg


Metoprolol Tartrate (Lopressor)  50 mg PO BID [facility]


   Last Admin: 01/15/19 10:42 Dose:  50 mg


Sevelamer Carbonate (Renvela)  800 mg PO TIDCC [facility]


   Last Admin: 01/15/19 12:21 Dose:  800 mg


Tamsulosin HCl (Flomax)  0.4 mg PO DAILY [facility]


   Last Admin: 01/15/19 12:18 Dose:  0.4 mg


Vitamin B Complex/Vit C/Folic Acid (Nephro-Roxy)  1 tab PO 0800 [facility]











Physical Exam





- Constitutional


Appears: Non-toxic, No Acute Distress





- Head Exam


Head Exam: NORMAL INSPECTION





- Eye Exam


Eye Exam: EOMI, Normal appearance





- ENT Exam


ENT Exam: Mucous Membranes Moist





- Respiratory Exam


Respiratory Exam: Decreased Breath Sounds, Rales, NORMAL BREATHING PATTERN


Additional comments: 





Faint rales B/L lower lung fields 





- Cardiovascular Exam


Cardiovascular Exam: +S1, +S2.  absent: Systolic Murmur





- GI/Abdominal Exam


GI & Abdominal Exam: Normal Bowel Sounds, Soft.  absent: Mass, Tenderness





- Extremities Exam


Extremities exam: Positive for: pedal edema.  Negative for: calf tenderness


Additional comments: 





3+ pitting edema B/L LE


L 5 digit amputation


R 1 digit bleeding around nail bed (patient states it happened while 

transportation to hospital)


Xeroderma of feet 





- Back Exam


Back exam: absent: CVA tenderness (L), CVA tenderness (R)





- Neurological Exam


Neurological exam: Alert, Oriented x3





- Psychiatric Exam


Psychiatric exam: Normal Affect, Normal Mood





- Skin


Skin Exam: Dry, Intact





Results





- Vital Signs


Recent Vital Signs: 


                                Last Vital Signs











Temp  97.6 F   01/15/19 07:00


 


Pulse  70   01/15/19 07:00


 


Resp  18   01/15/19 07:00


 


BP  158/77 H  01/15/19 10:41


 


Pulse Ox  99   01/15/19 07:00














- Labs


Result Diagrams: 


                                 01/15/19 06:36





                                 01/15/19 06:36


Labs: 


                         Laboratory Results - last 24 hr











  01/14/19 01/14/19 01/14/19





  16:35 17:06 17:06


 


WBC   


 


RBC   


 


Hgb   


 


Hct   


 


MCV   


 


MCH   


 


MCHC   


 


RDW   


 


Plt Count   


 


MPV   


 


Neut % (Auto)   


 


Lymph % (Auto)   


 


Mono % (Auto)   


 


Eos % (Auto)   


 


Baso % (Auto)   


 


Neut # (Auto)   


 


Lymph # (Auto)   


 


Mono # (Auto)   


 


Eos # (Auto)   


 


Baso # (Auto)   


 


Sodium   


 


Potassium   


 


Chloride   


 


Carbon Dioxide   


 


Anion Gap   


 


BUN   


 


Creatinine   


 


Est GFR ( Amer)   


 


Est GFR (Non-Af Amer)   


 


POC Glucose (mg/dL)  104  


 


Random Glucose   


 


Calcium   


 


Phosphorus   


 


Magnesium   


 


Total Bilirubin   


 


AST   


 


ALT   


 


Alkaline Phosphatase   


 


Total Protein   


 


Albumin   


 


Globulin   


 


Albumin/Globulin Ratio   


 


25-OH Vitamin D Total   


 


Urine Color   


 


Urine Clarity   


 


Urine pH   


 


Ur Specific Gravity   


 


Urine Protein   


 


Urine Glucose (UA)   


 


Urine Ketones   


 


Urine Blood   


 


Urine Nitrate   


 


Urine Bilirubin   


 


Urine Urobilinogen   


 


Ur Leukocyte Esterase   


 


Urine WBC (Auto)   


 


Urine RBC (Auto)   


 


Ur Squamous Epith Cells   


 


Amorphous Sediment   


 


Urine Bacteria   


 


Hyaline Casts   


 


Granular Casts (Auto)   


 


Complement C3   


 


Complement C4   


 


Hep Bs Antigen   Negative 


 


Hep Bs Antibody    Negative


 


Hep B Core IgM Ab   Negative 


 


Hepatitis C Antibody   Negative 


 


HIV 1&2 Antibody Screen   














  01/14/19 01/14/19 01/14/19





  17:06 17:06 21:08


 


WBC   


 


RBC   


 


Hgb   


 


Hct   


 


MCV   


 


MCH   


 


MCHC   


 


RDW   


 


Plt Count   


 


MPV   


 


Neut % (Auto)   


 


Lymph % (Auto)   


 


Mono % (Auto)   


 


Eos % (Auto)   


 


Baso % (Auto)   


 


Neut # (Auto)   


 


Lymph # (Auto)   


 


Mono # (Auto)   


 


Eos # (Auto)   


 


Baso # (Auto)   


 


Sodium   


 


Potassium   


 


Chloride   


 


Carbon Dioxide   


 


Anion Gap   


 


BUN   


 


Creatinine   


 


Est GFR ( Amer)   


 


Est GFR (Non-Af Amer)   


 


POC Glucose (mg/dL)    121 H


 


Random Glucose   


 


Calcium   


 


Phosphorus   


 


Magnesium   


 


Total Bilirubin   


 


AST   


 


ALT   


 


Alkaline Phosphatase   


 


Total Protein   


 


Albumin   


 


Globulin   


 


Albumin/Globulin Ratio   


 


25-OH Vitamin D Total   


 


Urine Color   


 


Urine Clarity   


 


Urine pH   


 


Ur Specific Gravity   


 


Urine Protein   


 


Urine Glucose (UA)   


 


Urine Ketones   


 


Urine Blood   


 


Urine Nitrate   


 


Urine Bilirubin   


 


Urine Urobilinogen   


 


Ur Leukocyte Esterase   


 


Urine WBC (Auto)   


 


Urine RBC (Auto)   


 


Ur Squamous Epith Cells   


 


Amorphous Sediment   


 


Urine Bacteria   


 


Hyaline Casts   


 


Granular Casts (Auto)   


 


Complement C3   80.0 L 


 


Complement C4   40.7 


 


Hep Bs Antigen   


 


Hep Bs Antibody   


 


Hep B Core IgM Ab   


 


Hepatitis C Antibody   


 


HIV 1&2 Antibody Screen  Negative  














  01/15/19 01/15/19 01/15/19





  06:17 06:36 06:36


 


WBC   6.9 


 


RBC   3.63 L 


 


Hgb   10.9 L 


 


Hct   33.8 L 


 


MCV   93.2 


 


MCH   30.1 


 


MCHC   32.3 L 


 


RDW   14.1 


 


Plt Count   281 


 


MPV   7.9 


 


Neut % (Auto)   51.9 


 


Lymph % (Auto)   24.2 


 


Mono % (Auto)   15.9 H 


 


Eos % (Auto)   7.3 H 


 


Baso % (Auto)   0.7 


 


Neut # (Auto)   3.6 


 


Lymph # (Auto)   1.7 


 


Mono # (Auto)   1.1 H 


 


Eos # (Auto)   0.5 


 


Baso # (Auto)   0.1 


 


Sodium    139


 


Potassium    5.2


 


Chloride    109 H


 


Carbon Dioxide    21 L


 


Anion Gap    13


 


BUN    54 H


 


Creatinine    6.6 H


 


Est GFR ( Amer)    11


 


Est GFR (Non-Af Amer)    9


 


POC Glucose (mg/dL)  100  


 


Random Glucose    94


 


Calcium    7.8 L


 


Phosphorus    6.0 H


 


Magnesium    2.1


 


Total Bilirubin    0.3


 


AST    62 H


 


ALT    41


 


Alkaline Phosphatase    113


 


Total Protein    5.9 L


 


Albumin    3.0 L


 


Globulin    2.9


 


Albumin/Globulin Ratio    1.1


 


25-OH Vitamin D Total   


 


Urine Color   


 


Urine Clarity   


 


Urine pH   


 


Ur Specific Gravity   


 


Urine Protein   


 


Urine Glucose (UA)   


 


Urine Ketones   


 


Urine Blood   


 


Urine Nitrate   


 


Urine Bilirubin   


 


Urine Urobilinogen   


 


Ur Leukocyte Esterase   


 


Urine WBC (Auto)   


 


Urine RBC (Auto)   


 


Ur Squamous Epith Cells   


 


Amorphous Sediment   


 


Urine Bacteria   


 


Hyaline Casts   


 


Granular Casts (Auto)   


 


Complement C3   


 


Complement C4   


 


Hep Bs Antigen   


 


Hep Bs Antibody   


 


Hep B Core IgM Ab   


 


Hepatitis C Antibody   


 


HIV 1&2 Antibody Screen   














  01/15/19 01/15/19 01/15/19





  06:36 08:19 11:36


 


WBC   


 


RBC   


 


Hgb   


 


Hct   


 


MCV   


 


MCH   


 


MCHC   


 


RDW   


 


Plt Count   


 


MPV   


 


Neut % (Auto)   


 


Lymph % (Auto)   


 


Mono % (Auto)   


 


Eos % (Auto)   


 


Baso % (Auto)   


 


Neut # (Auto)   


 


Lymph # (Auto)   


 


Mono # (Auto)   


 


Eos # (Auto)   


 


Baso # (Auto)   


 


Sodium   


 


Potassium   


 


Chloride   


 


Carbon Dioxide   


 


Anion Gap   


 


BUN   


 


Creatinine   


 


Est GFR ( Amer)   


 


Est GFR (Non-Af Amer)   


 


POC Glucose (mg/dL)    163 H


 


Random Glucose   


 


Calcium   


 


Phosphorus   


 


Magnesium   


 


Total Bilirubin   


 


AST   


 


ALT   


 


Alkaline Phosphatase   


 


Total Protein   


 


Albumin   


 


Globulin   


 


Albumin/Globulin Ratio   


 


25-OH Vitamin D Total  < 12.8 L  


 


Urine Color   Yellow 


 


Urine Clarity   Hazy 


 


Urine pH   5.0 


 


Ur Specific Gravity   1.014 


 


Urine Protein   3+ H 


 


Urine Glucose (UA)   1+ H 


 


Urine Ketones   Negative 


 


Urine Blood   3+ H 


 


Urine Nitrate   Negative 


 


Urine Bilirubin   Negative 


 


Urine Urobilinogen   Normal 


 


Ur Leukocyte Esterase   Neg 


 


Urine WBC (Auto)   4 


 


Urine RBC (Auto)   9 H 


 


Ur Squamous Epith Cells   3 


 


Amorphous Sediment   Rare H 


 


Urine Bacteria   Occ H 


 


Hyaline Casts   6-10 H 


 


Granular Casts (Auto)   6 


 


Complement C3   


 


Complement C4   


 


Hep Bs Antigen   


 


Hep Bs Antibody   


 


Hep B Core IgM Ab   


 


Hepatitis C Antibody   


 


HIV 1&2 Antibody Screen   














Assessment & Plan





- Assessment and Plan (Free Text)


Assessment: 





58 Y male presented to ED for worsening SOB, vascular surgery consulted by 

nephro for HD access placement for fluid overload s/p patient remaining oliguric

despite diuretics challenge











Plan: 


- Keep patient NPO


- OR for permacath placement later this evening


- D/w Dr. Bernarda Hull, PGY1 


Vascular surgery

## 2019-01-15 NOTE — CP.PCM.PN
Subjective





- Date & Time of Evaluation


Date of Evaluation: 01/15/19


Time of Evaluation: 11:18





- Subjective


Subjective: 





Nephrology Consultation Note:





Assessment: critical


Acute Kidney Injury (N17.9) ? etiology. eval for GN such as ANCA


mild hyerkalemia and metabolic acidosis


fluid overload, possible CHF exacerbation EF 45%


Diabetic chronic Kidney Disease (E11.22) 


Hypertensive Chronic Kidney Disease (I12.9)


Chronic Kidney Disease (N18.3) Stage 3 with? mg proteinuria (R80.9) likely due 

to HTN


morbid obesity


hx of cavitary lung lesions





Plan


renal replacement therapy initiation is indicated at this time as pt oliguric 

despite diuretics challenge and fluid overloaded. pros/cons of HD discussed and 

pt agreeable consented.


will consult surgery for HD access and start HD once in place


Hypertension control with meds as ordered. Maintain hemodynamics stable. Avoid 

hypotension. Patient not on ACEI/ARB due to recent BOBO


Monitor Input/Output, daily weights and renal function with basic metabolic 

panel


diuresis with IV lasix 80 bid and metolazone 5 mg bid in the interim


cardiology pulmonary following


started flomax, vit d and phos binders, nephrovite





Check urine analysis, spot protein/creatinine, albumin/creatinine ratio, renal 

and bladder sonogram


Check GN work up as C3, C4, TANYA, Anti dsDNA, ANCA (MPO and FL-3), Anti GBM 

antibody, HIV/Hep B and Hep C serology 


serum protein electrophoresis with immunofixation, serum free light chain assay 

(Kappa/Lambda)


Check for 25-OH vitamin D, iPTH, phosphorus level. 





Dose meds/antibiotics for reduced GFR. Avoid fleets enema/magnesium based 

laxatives. Avoid nephrotoxins/NSAIDs/ iodinated contrast (unless needed 

emergently)


Glycemic control


Further work up/management as per primary team





Thanks for allowing me to participate in care of your patient. Will follow 

patient with you. Please call if any Qs. had d/w team


Dr Anton Mcdaniel


Office: [phone] Cell: [phone] Fax: [phone]





Chief Complaint; SOB


Reason for consult: Acute Kidney Injury


HPI: Pt is a 58 M with hx of diabetes Mellitus ( 5years), hypertension (recent 

years) morbid obesity no regular medical follow up presented with complaints of 

worsening SOB on minimal exertion and on lying down. reported leg swelling. 

symptoms only for last few days as per pt. his serum cr 1.5-1.6 in 2016 also 

with cavitary lesion in lung that time. now cr 5 range


Denies OTC/herbal meds or NSAIDs


No recent iodinated contrast exposure. No obvious episodes of low BP. bP high in

200s when came


denies smoking/etoh or drugs





ROS: c/o leg swelling and fluid weeping out


Cardiovascular: No chest pain. 


Pulmonary: c/o shortness of breath 


Gastrointestinal: denies abdominal pain No nausea. No vomiting. 


Genitourinary: No pain while urinating. Denies blood in urine. 


All other negative except as mentioned in HPI





Physical Examination:      


General Appearance: Comfortable, in no acute respiratory distress, co-operative 

. morbid obese


Vitals reviewed and noted as below


Head; Atraumatic, normocephalic


ENT: no ulcers no thrush. Tongue is midline. Oropharynx: no rash or ulcers.


EYES: Pupils are equal, round and reactive to light accommodation. Eye muscles 

and extraocular movement intact. Sclera is anicteric.


Neck; supple no lymphadenopathy, no thyromegaly or bruit


Lungs: Increased respiratory rate/effort. Breath sounds bilateral reduced at 

bases with crackles ++


Heart: Normal rate. s1s2 normal. No rub or gallop. 


Extremities: 3+ edema. No varicose veins


Neurological: Patient is alert, awake and oriented to person, place and time. No

focal deficit. Strength bilateral appropriate and equal


Skin: Warm and dry. Normal turgor. No rash. Palpitation: Normal elasticity for 

age


Abdomen: Abdomen is soft. Bowel sounds +. There is no abdominal tenderness, no 

guarding/rigidity no organomegaly


Psych: normal insight and normal affect/mood


MSK: no joint tenderness or swelling. Digits and nails normal, no deformity. 


: kidney or bladder not palpable





Labs/imaging reviewed.


Past medical history, past surgical history, family history, social history, 

allergy reviewed and noted as below


Family hx: no hx of CKD. Rest non-contributory 











Objective





- Vital Signs/Intake and Output


Vital Signs (last 24 hours): 


                                        











Temp Pulse Resp BP Pulse Ox


 


 97.6 F   70   18   158/77 H  99 


 


 01/15/19 07:00  01/15/19 07:00  01/15/19 07:00  01/15/19 10:41  01/15/19 07:00








Intake and Output: 


                                        











 01/15/19 01/15/19





 06:59 18:59


 


Intake Total 580 


 


Output Total 400 


 


Balance 180 














- Medications


Medications: 


                               Current Medications





Dextrose (Dextrose 50% Inj)  0 ml IV STAT PRN; Protocol


   PRN Reason: Hypoglycemia Protocol


Dextrose (Glutose 15)  0 gm PO ONCE PRN; Protocol


   PRN Reason: Hypoglycemia Protocol


Ergocalciferol (Drisdol 50,000 Intl Units Cap)  1 cap PO Q7D [facility]


Furosemide (Lasix)  80 mg IVP BID [facility]


   Last Admin: 01/15/19 10:41 Dose:  80 mg


Glucagon (Glucagen Diagnostic Kit)  0 mg IM STAT PRN; Protocol


   PRN Reason: Hypoglycemia Protocol


Heparin Sodium (Porcine) (Heparin)  5,000 units SC Q8 [facility]


   Last Admin: 01/15/19 05:53 Dose:  5,000 units


Hydralazine HCl (Apresoline)  50 mg PO Q6 [facility]


   Last Admin: 01/15/19 05:53 Dose:  50 mg


Dextrose (Dextrose 5% In Water 1000 Ml)  1,000 mls @ 0 mls/hr IV .Q0M PRN; 

Protocol


   PRN Reason: Hypoglycemia Protocol


Insulin Aspart (Novolog)  0 unit SC ACHS [facility]; Protocol


   Last Admin: 01/15/19 08:00 Dose:  Not Given


Lisinopril (Zestril)  30 mg PO DAILY [facility]


Metolazone (Zaroxolyn)  5 mg PO BID [facility]


   Stop: 01/19/19 18:01


   Last Admin: 01/15/19 10:42 Dose:  5 mg


Metoprolol Tartrate (Lopressor)  50 mg PO BID [facility]


   Last Admin: 01/15/19 10:42 Dose:  50 mg


Sevelamer Carbonate (Renvela)  800 mg PO TIDCC [facility]


Vitamin B Complex/Vit C/Folic Acid (Nephro-Roxy)  1 tab PO 0800 [facility]











- Labs


Labs: 


                                        





                                 01/15/19 06:36 





                                 01/15/19 06:36

## 2019-01-16 RX ADMIN — INSULIN ASPART SCH: 100 INJECTION, SOLUTION INTRAVENOUS; SUBCUTANEOUS at 12:36

## 2019-01-16 RX ADMIN — INSULIN ASPART SCH: 100 INJECTION, SOLUTION INTRAVENOUS; SUBCUTANEOUS at 07:30

## 2019-01-16 RX ADMIN — INSULIN ASPART SCH: 100 INJECTION, SOLUTION INTRAVENOUS; SUBCUTANEOUS at 21:59

## 2019-01-16 RX ADMIN — Medication SCH TAB: at 09:17

## 2019-01-16 RX ADMIN — INSULIN ASPART SCH: 100 INJECTION, SOLUTION INTRAVENOUS; SUBCUTANEOUS at 16:30

## 2019-01-16 RX ADMIN — SODIUM CHLORIDE SCH UNITS: 9 INJECTION, SOLUTION INTRAMUSCULAR; INTRAVENOUS; SUBCUTANEOUS at 10:33

## 2019-01-16 NOTE — CP.PCM.PN
Subjective





- Date & Time of Evaluation


Date of Evaluation: 01/16/19


Time of Evaluation: 07:00





- Subjective


Subjective: 





Vascular Surgery Progress Note for Dr. Menchaca





58M seen and evaluated at bedside. No acute events overnight. No complaints this

morning. Patient scheduled for dialysis with permacatheter. Denies f/c, n/v/d, 

SOB, CP, headaches, dizziness. 





Objective





- Vital Signs/Intake and Output


Vital Signs (last 24 hours): 


                                        











Temp Pulse Resp BP Pulse Ox


 


 98.4 F   70   20   115/62   99 


 


 01/16/19 15:00  01/16/19 16:00  01/16/19 15:00  01/16/19 15:00  01/16/19 15:00








Intake and Output: 


                                        











 01/16/19 01/16/19





 06:59 18:59


 


Intake Total 100 


 


Balance 100 














- Medications


Medications: 


                               Current Medications





Dextrose (Dextrose 50% Inj)  0 ml IV STAT PRN; Protocol


   PRN Reason: Hypoglycemia Protocol


Dextrose (Glutose 15)  0 gm PO ONCE PRN; Protocol


   PRN Reason: Hypoglycemia Protocol


Ergocalciferol (Drisdol 50,000 Intl Units Cap)  1 cap PO Q7D [facility]


   Last Admin: 01/15/19 12:18 Dose:  1 cap


Furosemide (Lasix)  80 mg PO DAILY [facility]


Glucagon (Glucagen Diagnostic Kit)  0 mg IM STAT PRN; Protocol


   PRN Reason: Hypoglycemia Protocol


Heparin Sodium (Porcine) (Heparin)  5,000 units SC Q8 [facility]


   Last Admin: 01/16/19 14:28 Dose:  5,000 units


Heparin Sodium (Porcine) (Heparin)  3,700 units IVP MWF [facility]


   Last Admin: 01/16/19 10:33 Dose:  3,700 units


Hydralazine HCl (Apresoline)  50 mg PO BID [facility]


Insulin Aspart (Novolog)  0 unit SC ACHS [facility]; Protocol


   Last Admin: 01/16/19 12:36 Dose:  Not Given


Lisinopril (Zestril)  40 mg PO QPM [facility]


Metoprolol Tartrate (Lopressor)  50 mg PO BID [facility]


   Last Admin: 01/16/19 09:17 Dose:  50 mg


Sevelamer Carbonate (Renvela)  800 mg PO TIDCC [facility]


   Last Admin: 01/16/19 12:37 Dose:  Not Given


Tamsulosin HCl (Flomax)  0.4 mg PO DAILY [facility]


   Last Admin: 01/16/19 09:17 Dose:  0.4 mg


Vitamin B Complex/Vit C/Folic Acid (Nephro-Roxy)  1 tab PO 0800 [facility]


   Last Admin: 01/16/19 09:17 Dose:  1 tab











- Labs


Labs: 


                                        





                                 01/15/19 06:36 





                                 01/15/19 06:36 











- Constitutional


Appears: Well, Non-toxic, No Acute Distress





- Head Exam


Head Exam: ATRAUMATIC, NORMAL INSPECTION, NORMOCEPHALIC





- Eye Exam


Eye Exam: EOMI





- ENT Exam


ENT Exam: Mucous Membranes Moist





- Respiratory Exam


Respiratory Exam: NORMAL BREATHING PATTERN.  absent: Respiratory Distress





- Cardiovascular Exam


Cardiovascular Exam: REGULAR RHYTHM.  absent: Tachycardia





- GI/Abdominal Exam


GI & Abdominal Exam: Soft, Normal Bowel Sounds.  absent: Tenderness





- Neurological Exam


Neurological Exam: Alert, Awake





- Psychiatric Exam


Psychiatric exam: Normal Affect, Normal Mood





- Skin


Additional comments: 





R IJ permacath c/d/i 





Assessment and Plan





- Assessment and Plan (Free Text)


Assessment: 





58M s/p R IJ Permacatheter POD1


Plan: 





Permacath for dialysis 


Dialysis as scheduled 


Will sign off


Please reconsult as needed 





Vazquez Martínez PGY1

## 2019-01-16 NOTE — OP
PROCEDURE DATE:  01/15/2019



PREOPERATIVE DIAGNOSIS:  Renal failure.



POSTOPERATIVE DIAGNOSIS:  Renal failure.



PROCEDURES CARRIED OUT:  PermCath right jugular vein, C-arm fluoroscopy,

ultrasound-guided puncture, and micropuncture technique.



SURGEON:  Shubham Menchaca Jr., MD



ASSISTANT:   _____.



TYPE OF ANESTHESIA:  Local with sedation.



ANESTHESIOLOGIST:  Mr. Hull.



INDICATIONS OF PROCEDURE:  The patient is a 58-year-old male with just

diagnosed renal insufficiency, now requiring urgent dialysis _____

catheter.



OPERATIVE FINDINGS:  Catheter was placed uneventfully via jugular vein.



DESCRIPTION OF PROCEDURE:  The patient was given local anesthesia.  Using

ultrasound guidance and micropuncture technique, the right jugular vein was

punctured.  Under fluoroscopic control, guidewire was advanced centrally. 

A sheath dilator was passed over this, and then, the catheter was

positioned with the tip of the superior vena cava in right atrial junction.

It originated on the right chest wall, went through the jugular vein, and

terminated in superior vena cava.  It was flushed with heparinized saline

with good return.  Blood loss of the procedure was less than 10 mL.



OPERATIONS CARRIED OUT:  PermCath right jugular vein with C-arm

fluoroscopy, ultrasound-guided puncture, and micropuncture technique.



Ultrasound imaging of the neck showed the vein was approximately 19 mm in

diameter with normal compressibility and no intraluminal thrombosis.





__________________________________________

Shubham Menchaca Jr., MD





DD:  01/15/2019 16:39:37

DT:  01/15/2019 21:57:58

Job # [id number]

## 2019-01-16 NOTE — CP.PCM.PN
Subjective





- Date & Time of Evaluation


Date of Evaluation: 01/16/19


Time of Evaluation: 10:00





- Subjective


Subjective: 





Nephrology Consultation Note:





Assessment: critical


Acute Kidney Injury (N17.9) ? etiology. eval for GN such as ANCA


mild hyerkalemia and metabolic acidosis


fluid overload, possible CHF exacerbation EF 45%


Diabetic chronic Kidney Disease (E11.22) 


Hypertensive Chronic Kidney Disease (I12.9)


Chronic Kidney Disease (N18.3) Stage 3 with 8 gram proteinuria (R80.9) likely 

due to HTN/obesity


morbid obesity


hx of cavitary lung lesions





Plan


HD started 1/15/19. plan for HD today then tomorrow as well


Hypertension control with meds as ordered. Maintain hemodynamics stable. Avoid 

hypotension. Patient not on ACEI/ARB due to recent BOBO


Monitor Input/Output, daily weights and renal function with basic metabolic 

panel


diuretics change to PO


cardiology pulmonary following


started flomax, vit d and phos binders, nephrovite





Check urine analysis, spot protein/creatinine, albumin/creatinine ratio, renal 

and bladder sonogram


Check GN work up as C3, C4, TANYA, Anti dsDNA, ANCA (MPO and UT-3), Anti GBM 

antibody, HIV/Hep B and Hep C serology 


serum protein electrophoresis with immunofixation, serum free light chain assay 

(Kappa/Lambda)


Check for 25-OH vitamin D, iPTH, phosphorus level. 





Dose meds/antibiotics for reduced GFR. Avoid fleets enema/magnesium based 

laxatives. Avoid nephrotoxins/NSAIDs/ iodinated contrast (unless needed emergen

tly)


Glycemic control


Further work up/management as per primary team


SW consult for outpt HD placement





Thanks for allowing me to participate in care of your patient. Will follow 

patient with you. Please call if any Qs. had d/w team


Dr Anton Mcdaniel


Office: [phone] Cell: [phone] Fax: [phone]





Chief Complaint; SOB


Reason for consult: Acute Kidney Injury


HPI: Pt is a 58 M with hx of diabetes Mellitus ( 5years), hypertension (recent 

years) morbid obesity no regular medical follow up presented with complaints of 

worsening SOB on minimal exertion and on lying down. reported leg swelling. 

symptoms only for last few days as per pt. his serum cr 1.5-1.6 in 2016 also 

with cavitary lesion in lung that time. now cr 5 range


Denies OTC/herbal meds or NSAIDs


No recent iodinated contrast exposure. No obvious episodes of low BP. bP high in

200s when came


denies smoking/etoh or drugs





ROS: c/o leg swelling and fluid weeping out


Cardiovascular: No chest pain. 


Pulmonary: c/o shortness of breath but better


Gastrointestinal: denies abdominal pain No nausea. No vomiting. 


Genitourinary: No pain while urinating. Denies blood in urine. 


All other negative except as mentioned in HPI





Physical Examination:      


General Appearance: Comfortable, in no acute respiratory distress, co-operative 

. morbid obese


Vitals reviewed and noted as below


Head; Atraumatic, normocephalic


ENT: no ulcers no thrush. Tongue is midline. Oropharynx: no rash or ulcers.


EYES: Pupils are equal, round and reactive to light accommodation. Eye muscles 

and extraocular movement intact. Sclera is anicteric.


Neck; supple no lymphadenopathy, no thyromegaly or bruit


Lungs: Increased respiratory rate/effort. Breath sounds bilateral reduced at 

bases with crackles ++


Heart: Normal rate. s1s2 normal. No rub or gallop. 


Extremities: 3+ edema. No varicose veins


Neurological: Patient is alert, awake and oriented to person, place and time. No

focal deficit. Strength bilateral appropriate and equal


Skin: Warm and dry. Normal turgor. No rash. Palpitation: Normal elasticity for 

age


Abdomen: Abdomen is soft. Bowel sounds +. There is no abdominal tenderness, no 

guarding/rigidity no organomegaly


Psych: normal insight and normal affect/mood


MSK: no joint tenderness or swelling. Digits and nails normal, no deformity. 


: kidney or bladder not palpable


access: pc





Labs/imaging reviewed.


Past medical history, past surgical history, family history, social history, 

allergy reviewed and noted as below


Family hx: no hx of CKD. Rest non-contributory 








Objective





- Vital Signs/Intake and Output


Vital Signs (last 24 hours): 


                                        











Temp Pulse Resp BP Pulse Ox


 


 98 F   67   22   149/84   99 


 


 01/16/19 12:45  01/16/19 12:45  01/16/19 12:45  01/16/19 12:45  01/16/19 12:45








Intake and Output: 


                                        











 01/16/19 01/16/19





 06:59 18:59


 


Intake Total 100 


 


Balance 100 














- Medications


Medications: 


                               Current Medications





Dextrose (Dextrose 50% Inj)  0 ml IV STAT PRN; Protocol


   PRN Reason: Hypoglycemia Protocol


Dextrose (Glutose 15)  0 gm PO ONCE PRN; Protocol


   PRN Reason: Hypoglycemia Protocol


Ergocalciferol (Drisdol 50,000 Intl Units Cap)  1 cap PO [facility]7D [facility]


   Last Admin: 01/15/19 12:18 Dose:  1 cap


Furosemide (Lasix)  80 mg IVP BID [facility]


   Last Admin: 01/16/19 09:16 Dose:  80 mg


Glucagon (Glucagen Diagnostic Kit)  0 mg IM STAT PRN; Protocol


   PRN Reason: Hypoglycemia Protocol


Heparin Sodium (Porcine) (Heparin)  5,000 units SC Q8 [facility]


   Last Admin: 01/16/19 14:28 Dose:  5,000 units


Heparin Sodium (Porcine) (Heparin)  3,700 units IVP MWF [facility]


   Last Admin: 01/16/19 10:33 Dose:  3,700 units


Hydralazine HCl (Apresoline)  50 mg PO Q6 [facility]


   Last Admin: 01/16/19 12:35 Dose:  Not Given


Dextrose (Dextrose 5% In Water 1000 Ml)  1,000 mls @ 0 mls/hr IV .Q0M PRN; 

Protocol


   PRN Reason: Hypoglycemia Protocol


Insulin Aspart (Novolog)  0 unit SC ACHS [facility]; Protocol


   Last Admin: 01/16/19 12:36 Dose:  Not Given


Lisinopril (Zestril)  30 mg PO DAILY [facility]


Metolazone (Zaroxolyn)  5 mg PO BID [facility]


   Stop: 01/19/19 18:01


   Last Admin: 01/16/19 11:15 Dose:  Not Given


Metoprolol Tartrate (Lopressor)  50 mg PO BID [facility]


   Last Admin: 01/16/19 09:17 Dose:  50 mg


Sevelamer Carbonate (Renvela)  800 mg PO TIDCC [facility]


   Last Admin: 01/16/19 12:37 Dose:  Not Given


Tamsulosin HCl (Flomax)  0.4 mg PO DAILY [facility]


   Last Admin: 01/16/19 09:17 Dose:  0.4 mg


Vitamin B Complex/Vit C/Folic Acid (Nephro-Roxy)  1 tab PO 0800 [facility]


   Last Admin: 01/16/19 09:17 Dose:  1 tab











- Labs


Labs: 


                                        





                                 01/15/19 06:36 





                                 01/15/19 06:36

## 2019-01-16 NOTE — CP.PCM.PN
Subjective





- Date & Time of Evaluation


Date of Evaluation: 01/16/19


Time of Evaluation: 16:35





Objective





- Vital Signs/Intake and Output


Vital Signs (last 24 hours): 


                                        











Temp Pulse Resp BP Pulse Ox


 


 98 F   67   22   149/84   99 


 


 01/16/19 12:45  01/16/19 12:45  01/16/19 12:45  01/16/19 12:45  01/16/19 12:45








Intake and Output: 


                                        











 01/16/19 01/16/19





 06:59 18:59


 


Intake Total 100 


 


Balance 100 














- Medications


Medications: 


                               Current Medications





Dextrose (Dextrose 50% Inj)  0 ml IV STAT PRN; Protocol


   PRN Reason: Hypoglycemia Protocol


Dextrose (Glutose 15)  0 gm PO ONCE PRN; Protocol


   PRN Reason: Hypoglycemia Protocol


Ergocalciferol (Drisdol 50,000 Intl Units Cap)  1 cap PO Q7D [facility]


   Last Admin: 01/15/19 12:18 Dose:  1 cap


Furosemide (Lasix)  80 mg PO DAILY [facility]


Glucagon (Glucagen Diagnostic Kit)  0 mg IM STAT PRN; Protocol


   PRN Reason: Hypoglycemia Protocol


Heparin Sodium (Porcine) (Heparin)  5,000 units SC Q8 [facility]


   Last Admin: 01/16/19 14:28 Dose:  5,000 units


Heparin Sodium (Porcine) (Heparin)  3,700 units IVP MWF [facility]


   Last Admin: 01/16/19 10:33 Dose:  3,700 units


Hydralazine HCl (Apresoline)  50 mg PO BID [facility]


Dextrose (Dextrose 5% In Water 1000 Ml)  1,000 mls @ 0 mls/hr IV .Q0M PRN; 

Protocol


   PRN Reason: Hypoglycemia Protocol


Insulin Aspart (Novolog)  0 unit SC ACHS [facility]; Protocol


   Last Admin: 01/16/19 12:36 Dose:  Not Given


Lisinopril (Zestril)  40 mg PO QPM [facility]


Metoprolol Tartrate (Lopressor)  50 mg PO BID [facility]


   Last Admin: 01/16/19 09:17 Dose:  50 mg


Sevelamer Carbonate (Renvela)  800 mg PO TIDCC [facility]


   Last Admin: 01/16/19 12:37 Dose:  Not Given


Tamsulosin HCl (Flomax)  0.4 mg PO DAILY [facility]


   Last Admin: 01/16/19 09:17 Dose:  0.4 mg


Vitamin B Complex/Vit C/Folic Acid (Nephro-Roxy)  1 tab PO 0800 [facility]


   Last Admin: 01/16/19 09:17 Dose:  1 tab











- Labs


Labs: 


                                        





                                 01/15/19 06:36 





                                 01/15/19 06:36 











Assessment and Plan


(1) ESRD (end stage renal disease)


Status: Acute   





(2) Heart failure


Status: Acute   





(3) Diabetes


Status: Chronic   





(4) Hypertension


Status: Chronic   





(5) Hypertensive emergency


Status: Acute

## 2019-01-16 NOTE — RAD
Date of service: 



01/15/2019



PROCEDURE:  Intraoperative Fluoroscopy. 



HISTORY:

RENAL FAILURE



FINDINGS:

Fluoroscopic assistance was provided for right-sided PermCath 

placement.  Please refer to the operative report from [facility].

## 2019-01-17 LAB
ALBUMIN (PEP): 2.6 G/DL (ref 3.8–4.8)
ALPHA-1-GLOBULIN (PEP): 0.4 G/DL (ref 0.2–0.3)
BASOPHILS # BLD AUTO: 0.1 K/UL (ref 0–0.2)
BASOPHILS NFR BLD: 0.9 % (ref 0–2)
BUN SERPL-MCNC: 44 MG/DL (ref 9–20)
CALCIUM SERPL-MCNC: 7.4 MG/DL (ref 8.6–10.4)
EOSINOPHIL # BLD AUTO: 0.9 K/UL (ref 0–0.7)
EOSINOPHIL NFR BLD: 11.8 % (ref 0–4)
ERYTHROCYTE [DISTWIDTH] IN BLOOD BY AUTOMATED COUNT: 14.4 % (ref 11.5–14.5)
GFR NON-AFRICAN AMERICAN: 11
HGB BLD-MCNC: 10.3 G/DL (ref 12–18)
LYMPHOCYTES # BLD AUTO: 1.2 K/UL (ref 1–4.3)
LYMPHOCYTES NFR BLD AUTO: 15.6 % (ref 20–40)
MCH RBC QN AUTO: 30.3 PG (ref 27–31)
MCHC RBC AUTO-ENTMCNC: 32.7 G/DL (ref 33–37)
MCV RBC AUTO: 92.6 FL (ref 80–94)
MONOCYTES # BLD: 0.7 K/UL (ref 0–0.8)
MONOCYTES NFR BLD: 8.8 % (ref 0–10)
NEUTROPHILS # BLD: 5 K/UL (ref 1.8–7)
NEUTROPHILS NFR BLD AUTO: 62.9 % (ref 50–75)
NRBC BLD AUTO-RTO: 0 % (ref 0–2)
PLATELET # BLD: 227 K/UL (ref 130–400)
PMV BLD AUTO: 8.4 FL (ref 7.2–11.7)
RBC # BLD AUTO: 3.41 MIL/UL (ref 4.4–5.9)
WBC # BLD AUTO: 8 K/UL (ref 4.8–10.8)

## 2019-01-17 PROCEDURE — 5A1D70Z PERFORMANCE OF URINARY FILTRATION, INTERMITTENT, LESS THAN 6 HOURS PER DAY: ICD-10-PCS

## 2019-01-17 RX ADMIN — Medication SCH TAB: at 11:01

## 2019-01-17 RX ADMIN — INSULIN ASPART SCH: 100 INJECTION, SOLUTION INTRAVENOUS; SUBCUTANEOUS at 07:30

## 2019-01-17 RX ADMIN — INSULIN ASPART SCH: 100 INJECTION, SOLUTION INTRAVENOUS; SUBCUTANEOUS at 16:50

## 2019-01-17 RX ADMIN — INSULIN ASPART SCH: 100 INJECTION, SOLUTION INTRAVENOUS; SUBCUTANEOUS at 22:08

## 2019-01-17 RX ADMIN — INSULIN ASPART SCH: 100 INJECTION, SOLUTION INTRAVENOUS; SUBCUTANEOUS at 12:28

## 2019-01-17 NOTE — VASCLAB
Date of service: 



01/16/2019



PROCEDURE:  Lower Extremity Venous Duplex Exam.



HISTORY:

r/o DVT 



PRIORS:

None. 



TECHNIQUE:

Bilateral common femoral, femoral, popliteal and posterior tibial, 

peroneal and great saphenous veins were evaluated. Flow was assessed 

with color Doppler, compressibility, assessment of phasic flow and 

augmentation response.



Report prepared by   Velasquez Smith, BS, RVT



FINDINGS:



RIGHT:

1. Common Femoral Vein: 



1.1. Compressibility - Fully compressible: Thrombus -  None : Flow - 

Phasic: Augmentation -Normal: Reflux - None.



2. Femoral Vein:



2.1. Compressibility - Fully compressible: Thrombus -  None : Flow - 

Phasic: Augmentation -Normal: Reflux - None.



3. Popliteal Vein: 



3.1. Compressibility - Fully compressible: Thrombus - None :  Flow - 

Phasic: Augmentation -Normal: Reflux - None.



4. Posterior Tibial Vein: 



4.1. Compressibility - : Thrombus -  : Flow - : Augmentation -: 

Reflux - .



5. Peroneal Vein:



5.1. Compressibility - : Thrombus -  : Flow - : Augmentation -: 

Reflux - .



6. Great Saphenous Vein:

6.1. Compressibility - Fully compressible: Thrombus - None: Flow - 

Phasic: Augmentation - Normal: Reflux - None.





LEFT:

1. Common Femoral Vein:



1.1.  Compressibility - Fully compressible: Thrombus -  None: Flow - 

Phasic: Augmentation -Normal: Reflux - None.



2. Femoral Vein:



2.1.  Compressibility - Fully compressible: Thrombus -  None: Flow - 

Phasic: Augmentation -Normal: Reflux - None.



3. Popliteal Vein:



3.1.  Compressibility - Fully compressible: Thrombus -  None : Flow - 

Phasic: Augmentation -Normal: Reflux - None.



4. Posterior Tibial Vein:



4.1.  Compressibility - : Thrombus -  : Flow - : Augmentation -: 

Reflux - .



5. Peroneal Vein:



5.1.  Compressibility - : Thrombus -  : Flow - : Augmentation -: 

Reflux - .



6. Great Saphenous Vein:

6.1.  Compressibility - Fully compressible: Thrombus -  None: Flow - 

Phasic: Augmentation - Normal: Reflux - None.





OTHER FINDINGS:  Right: None significant.



Left: None significant.



IMPRESSION:

Right: 



No evidence of deep or superficial vein thrombosis of the right lower 

extremity. Normal valve function noted of the right side.     



Left: 



No evidence of deep or superficial vein thrombosis of the left lower 

extremity. Normal valve function noted of the left side.

## 2019-01-17 NOTE — CP.PCM.PN
Subjective





- Date & Time of Evaluation


Date of Evaluation: 01/17/19


Time of Evaluation: 15:18





- Subjective


Subjective: 





Nephrology Consultation Note:





Assessment: stable


Acute Kidney Injury (N17.9) ? etiology. eval for GN such as ANCA. might be ESRD 


mild hyerkalemia and metabolic acidosis


fluid overload, possible CHF exacerbation EF 45%


Diabetic chronic Kidney Disease (E11.22) 


Hypertensive Chronic Kidney Disease (I12.9)


Chronic Kidney Disease (N18.3) Stage 3 with 8 gram proteinuria (R80.9) likely 

due to HTN/obesity


morbid obesity


hx of cavitary lung lesions in 2016





Plan


HD started 1/15/19. plan for HD today then Sat


Hypertension control with meds as ordered. Maintain hemodynamics stable. Avoid 

hypotension. Patient not on ACEI/ARB due to recent BOBO


Monitor Input/Output, daily weights and renal function with basic metabolic 

panel


diuretics change to PO


cardiology pulmonary following


started flomax, vit d and phos binders, nephrovite


no plan for biopsy considering renal cortical thinning on usg and morbid 

obesity, CHF unless serology for GN +





Check urine analysis, spot protein/creatinine, albumin/creatinine ratio, renal 

and bladder sonogram


Check GN work up as C3, C4, TANYA, Anti dsDNA, ANCA (MPO and [place]-3), Anti GBM 

antibody, HIV/Hep B and Hep C serology 


serum protein electrophoresis with immunofixation, serum free light chain assay 

(Kappa/Lambda)


Check for 25-OH vitamin D, iPTH, phosphorus level. 





Dose meds/antibiotics for reduced GFR. Avoid fleets enema/magnesium based 

laxatives. Avoid nephrotoxins/NSAIDs/ iodinated contrast (unless needed 

emergently)


Glycemic control


Further work up/management as per primary team


SW consult for outpt HD placement





Thanks for allowing me to participate in care of your patient. Will follow p

atient with you. Please call if any Qs. had d/w team


Dr Anton Mcdaniel


Office: [phone] Cell: [phone] Fax: [phone]





Chief Complaint; SOB


Reason for consult: Acute Kidney Injury


HPI: Pt is a 58 M with hx of diabetes Mellitus ( 5years), hypertension (recent 

years) morbid obesity no regular medical follow up presented with complaints of 

worsening SOB on minimal exertion and on lying down. reported leg swelling. 

symptoms only for last few days as per pt. his serum cr 1.5-1.6 in 2016 also 

with cavitary lesion in lung that time. now cr 5 range


Denies OTC/herbal meds or NSAIDs


No recent iodinated contrast exposure. No obvious episodes of low BP. bP high in

200s when came


denies smoking/etoh or drugs





ROS: 


Cardiovascular: No chest pain. 


Pulmonary: shortness of breath better


Gastrointestinal: denies abdominal pain No nausea. No vomiting. 


Genitourinary: No pain while urinating. Denies blood in urine. 


All other negative except as mentioned in HPI





Physical Examination:      


General Appearance: Comfortable, in no acute respiratory distress, co-operative 

. morbid obese


Vitals reviewed and noted as below


Head; Atraumatic, normocephalic


ENT: no ulcers no thrush. Tongue is midline. Oropharynx: no rash or ulcers.


EYES: Pupils are equal, round and reactive to light accommodation. Eye muscles 

and extraocular movement intact. Sclera is anicteric.


Neck; supple no lymphadenopathy, no thyromegaly or bruit


Lungs: Increased respiratory rate/effort. Breath sounds bilateral reduced at 

bases with crackles +


Heart: Normal rate. s1s2 normal. No rub or gallop. 


Extremities: 3+ edema. No varicose veins


Neurological: Patient is alert, awake and oriented to person, place and time. No

focal deficit. Strength bilateral appropriate and equal


Skin: Warm and dry. Normal turgor. No rash. Palpitation: Normal elasticity for 

age


Abdomen: Abdomen is soft. Bowel sounds +. There is no abdominal tenderness, no 

guarding/rigidity no organomegaly


Psych: normal insight and normal affect/mood


MSK: no joint tenderness or swelling. Digits and nails normal, no deformity. 


: kidney or bladder not palpable


access: pc





Labs/imaging reviewed.


Past medical history, past surgical history, family history, social history, 

allergy reviewed and noted as below


Family hx: no hx of CKD. Rest non-contributory 





Objective





- Vital Signs/Intake and Output


Vital Signs (last 24 hours): 


                                        











Temp Pulse Resp BP Pulse Ox


 


 98 F   74   20   158/82 H  99 


 


 01/17/19 07:00  01/17/19 07:00  01/17/19 07:00  01/17/19 11:02  01/17/19 07:00








Intake and Output: 


                                        











 01/17/19 01/17/19





 06:59 18:59


 


Intake Total 240 


 


Balance 240 














- Medications


Medications: 


                               Current Medications





Dextrose (Dextrose 50% Inj)  0 ml IV STAT PRN; Protocol


   PRN Reason: Hypoglycemia Protocol


Dextrose (Glutose 15)  0 gm PO ONCE PRN; Protocol


   PRN Reason: Hypoglycemia Protocol


Ergocalciferol (Drisdol 50,000 Intl Units Cap)  1 cap PO Q7D Replaced by [facility]


   Last Admin: 01/15/19 12:18 Dose:  1 cap


Furosemide (Lasix)  80 mg PO [facility]


Glucagon (Glucagen Diagnostic Kit)  0 mg IM STAT PRN; Protocol


   PRN Reason: Hypoglycemia Protocol


Heparin Sodium (Porcine) (Heparin)  3,700 units IVP [facility]


   Last Admin: 01/16/19 10:33 Dose:  3,700 units


Hydralazine HCl (Apresoline)  50 mg PO BID Replaced by [facility]


   Last Admin: 01/17/19 11:03 Dose:  50 mg


Insulin Aspart (Novolog)  0 unit SC [facility]; Protocol


   Last Admin: 01/17/19 12:28 Dose:  Not Given


Lisinopril (Zestril)  40 mg PO QPM Replaced by [facility]


   Last Admin: 01/16/19 18:59 Dose:  40 mg


Metoprolol Tartrate (Lopressor)  50 mg PO BID Replaced by [facility]


   Last Admin: 01/17/19 11:03 Dose:  50 mg


Sevelamer Carbonate (Renvela)  800 mg PO TIDCC Replaced by [facility]


   Last Admin: 01/17/19 11:02 Dose:  800 mg


Tamsulosin HCl (Flomax)  0.4 mg PO DAILY Replaced by [facility]


   Last Admin: 01/17/19 11:03 Dose:  0.4 mg


Vitamin B Complex/Vit C/Folic Acid (Nephro-Roxy)  1 tab PO 0800 Replaced by [facility]


   Last Admin: 01/17/19 11:01 Dose:  1 tab











- Labs


Labs: 


                                        





                                 01/17/19 14:51 





                                 01/17/19 14:51

## 2019-01-17 NOTE — VASCLAB
Date of service: 



01/16/2019



PROCEDURE:  Upper Extremity Venous Duplex Exam



HISTORY:

Vein mapping, Renal Failure, Pre-op AVF 



PRIORS:

None. 



TECHNIQUE:

Bilateral upper extremity, internal jugular, subclavian, axillary, 

brachial, ulnar, radial, basilic and upper cephalic veins were 

evaluated. Flow was assessed with color Doppler, compressibility, 

assessment of phasic flow and augmentation response.



Report prepared by   Velasquez Smith, BS, RVT



FINDINGS:



RIGHT:

1. Internal Jugular Vein: Compressibility - Fully compressible: 

Thrombus - None : Flow - Phasic 



2. Subclavian Vein:Compressibility - Fully compressible: Thrombus - 

None : Flow - Phasic 



3. Axillary Vein: Compressibility - Fully compressible: Thrombus - 

None 



4. Brachial Vein: Compressibility - Fully compressible: Thrombus -  

None 



5. Ulnar Vein:Compressibility - Fully compressible: Thrombus -  None 



6. Radial Vein:Compressibility - Fully compressible: Thrombus - None 



7. Cephalic Vein: Compressibility - Fully compressible: thrombus - 

None 



7.1. Upper Arm: Proximal Diameter: 0.34cm. Mid Diameter: 0.34cm. 

Distal Diameter: 0.37cm. Antecubital Fossa: Diameter: 0.47cm



7.2. Forearm: Proximal Diameter: 0.28cm. Mid Diameter:0.31cm. Distal 

Diameter: 0.34cm



8. Basilic Vein:Compressibility - Fully compressible: thrombus -  

None 

8.1. Upper Arm:Proximal Diameter: 0.52cm. Mid Diameter: 0.45cm. 

Distal Diameter: 0.48cm. Antecubital Fossa: Diameter: 0.50cm



8.2. Forearm: Proximal Diameter: 0.31cm. Mid Diameter:0.25cm. Distal 

Diameter: 0.26cm.





LEFT:

1. Internal Jugular Vein: Compressibility - Fully compressible: 

Thrombus - None : Flow - Phasic 



2. Subclavian Vein:Compressibility - Fully compressible: Thrombus - 

None : Flow - Phasic 



3. Axillary Vein: Compressibility - Fully compressible: Thrombus - 

None 



4. Brachial Vein: Compressibility - Fully compressible: Thrombus - 

None 



5. Ulnar Vein:Compressibility - Fully compressible: Thrombus - None 



6. Radial Vein:Compressibility - Fully compressible: Thrombus - None 



7. Cephalic Vein: Compressibility - Fully compressible: thrombus - 

None 



7.1. Upper Arm: Proximal Diameter: 0.34cm. Mid Diameter: 0.22cm. 

Distal Diameter: 0.28cm. Antecubital Fossa: Diameter: 0.39cm



7.2. Forearm: Proximal Diameter: 0.33cm. Mid Diameter:0.32cm. Distal 

Diameter: 0.35cm



8. Basilic Vein:Compressibility - Fully compressible: thrombus - None 

8.1. Upper Arm:Proximal Diameter: 0.44cm. Mid Diameter: 0.40cm. 

Distal Diameter: 0.35cm. Antecubital Fossa: Diameter: 0.47cm



8.2. Forearm: Proximal Diameter: 0.21cm. Mid Diameter:0.23cm. Distal 

Diameter: 0.26cm.





OTHER FINDINGS:  Right: None.



Left:  None.



IMPRESSION:

Right: Diameter measurements of the right cephalic vein is measured 

between 0.28 cm and 0.47 cm and basilic vein is measured between 0.25 

cm and 0.52 cm. 







Left: Diameter measurements of the left cephalic vein is measured 

between 0.22 cm and 0.39 cm and basilic vein is measured between 

0.21cm and 0.47cm.

## 2019-01-17 NOTE — CP.PCM.PN
Subjective





- Date & Time of Evaluation


Date of Evaluation: 01/17/19


Time of Evaluation: 18:50





Objective





- Vital Signs/Intake and Output


Vital Signs (last 24 hours): 


                                        











Temp Pulse Resp BP Pulse Ox


 


 97.7 F   76   18   141/58 L  148 H


 


 01/17/19 16:55  01/17/19 16:55  01/17/19 16:55  01/17/19 17:10  01/17/19 16:55








Intake and Output: 


                                        











 01/17/19 01/17/19





 06:59 18:59


 


Intake Total 240 


 


Balance 240 














- Medications


Medications: 


                               Current Medications





Dextrose (Dextrose 50% Inj)  0 ml IV STAT PRN; Protocol


   PRN Reason: Hypoglycemia Protocol


Dextrose (Glutose 15)  0 gm PO ONCE PRN; Protocol


   PRN Reason: Hypoglycemia Protocol


Ergocalciferol (Drisdol 50,000 Intl Units Cap)  1 cap PO Q7D [facility]


   Last Admin: 01/15/19 12:18 Dose:  1 cap


Furosemide (Lasix)  80 mg PO MWF [facility]


Glucagon (Glucagen Diagnostic Kit)  0 mg IM STAT PRN; Protocol


   PRN Reason: Hypoglycemia Protocol


Heparin Sodium (Porcine) (Heparin)  3,700 units IVP MWF [facility]


   Last Admin: 01/16/19 10:33 Dose:  3,700 units


Hydralazine HCl (Apresoline)  50 mg PO BID [facility]


   Last Admin: 01/17/19 11:03 Dose:  50 mg


Ins[facility])  [facility]; Protocol


   Last Admin: 01/17/19 16:50 Dose:  Not Given


Lisinopril (Zestril)  40 mg PO QPM [facility]


   Last Admin: 01/16/19 18:59 Dose:  40 mg


Metoprolol Tartrate (Lopressor)  50 mg PO BID [facility]


   Last Admin: 01/17/19 11:03 Dose:  50 mg


Sevelamer Carbonate (Renvela)  800 mg PO TIDCC [facility]


   Last Admin: 01/17/19 16:50 Dose:  Not Given


Tamsulosin HCl (Flomax)  0.4 mg PO DAILY [facility]


   Last Admin: 01/17/19 11:03 Dose:  0.4 mg


Vitamin B Complex/Vit C/Folic Acid (Nephro-Roxy)  1 tab PO 0800 [facility]


   Last Admin: 01/17/19 11:01 Dose:  1 tab











- Labs


Labs: 


                                        





                                 01/17/19 14:51 





                                 01/17/19 14:51 











Assessment and Plan


(1) ESRD (end stage renal disease)


Status: Acute   





(2) Heart failure


Status: Acute   





(3) Diabetes


Status: Chronic   





(4) Hypertension


Status: Chronic   





(5) Hypertensive emergency


Status: Acute

## 2019-01-18 LAB
ANCA SCREEN: NEGATIVE
P-ANCA ATYPICAL TITR SER IF: (no result) {TITER}
P-ANCA TITR SER IF: (no result) {TITER}

## 2019-01-18 RX ADMIN — Medication SCH TAB: at 08:40

## 2019-01-18 RX ADMIN — INSULIN ASPART SCH: 100 INJECTION, SOLUTION INTRAVENOUS; SUBCUTANEOUS at 22:00

## 2019-01-18 RX ADMIN — INSULIN ASPART SCH: 100 INJECTION, SOLUTION INTRAVENOUS; SUBCUTANEOUS at 11:35

## 2019-01-18 RX ADMIN — INSULIN ASPART SCH: 100 INJECTION, SOLUTION INTRAVENOUS; SUBCUTANEOUS at 16:30

## 2019-01-18 RX ADMIN — SODIUM CHLORIDE SCH: 9 INJECTION, SOLUTION INTRAMUSCULAR; INTRAVENOUS; SUBCUTANEOUS at 10:00

## 2019-01-18 RX ADMIN — INSULIN ASPART SCH: 100 INJECTION, SOLUTION INTRAVENOUS; SUBCUTANEOUS at 07:30

## 2019-01-18 RX ADMIN — IPRATROPIUM BROMIDE AND ALBUTEROL SULFATE SCH ML: .5; 3 SOLUTION RESPIRATORY (INHALATION) at 19:10

## 2019-01-18 NOTE — CP.PCM.PN
Subjective





- Date & Time of Evaluation


Date of Evaluation: 01/18/19


Time of Evaluation: 14:17





Objective





- Vital Signs/Intake and Output


Vital Signs (last 24 hours): 


                                        











Temp Pulse Resp BP Pulse Ox


 


 98.2 F   75   18   132/65   96 


 


 01/18/19 07:00  01/18/19 07:00  01/18/19 07:00  01/18/19 09:02  01/18/19 07:00








Intake and Output: 


                                        











 01/18/19 01/18/19





 06:59 18:59


 


Intake Total 480 


 


Balance 480 














- Medications


Medications: 


                               Current Medications





Albuterol/Ipratropium (Duoneb 3 Mg/0.5 Mg (3 Ml) Ud)  3 ml INH RQ6 [facility]


   Stop: 01/23/19 14:01


Dextrose (Dextrose 50% Inj)  0 ml IV STAT PRN; Protocol


   PRN Reason: Hypoglycemia Protocol


Dextrose (Glutose 15)  0 gm PO ONCE PRN; Protocol


   PRN Reason: Hypoglycemia Protocol


Ergocalciferol (Drisdol 50,000 Intl Units Cap)  1 cap PO Q7D [facility]


   Last Admin: 01/15/19 12:18 Dose:  1 cap


Furosemide (Lasix)  80 mg PO TTS [facility]


Glucagon (Glucagen Diagnostic Kit)  0 mg IM STAT PRN; Protocol


   PRN Reason: Hypoglycemia Protocol


Heparin Sodium (Porcine) (Heparin)  3,700 units IVP MWF [facility]


   Last Admin: 01/18/19 10:00 Dose:  Not Given


Hydralazine HCl (Apresoline)  50 mg PO BID [facility]


   Last Admin: 01/18/19 09:02 Dose:  50 mg


Insulin Aspart (Novolog)  0 unit SC [facility]; Protocol


   Last Admin: 01/18/19 11:35 Dose:  Not Given


Lisinopril (Zestril)  40 mg PO QPM [facility]


   Last Admin: 01/17/19 18:09 Dose:  Not Given


Metoprolol Tartrate (Lopressor)  50 mg PO BID [facility]


   Last Admin: 01/18/19 09:02 Dose:  50 mg


Sevelamer Carbonate (Renvela)  800 mg PO TIDCC [facility]


   Last Admin: 01/18/19 08:40 Dose:  800 mg


Tamsulosin HCl (Flomax)  0.4 mg PO DAILY [facility]


   Last Admin: 01/18/19 09:02 Dose:  0.4 mg


Vitamin B Complex/Vit C/Folic Acid (Nephro-Roxy)  1 tab PO 0800 [facility]


   Last Admin: 01/18/19 08:40 Dose:  1 tab











- Labs


Labs: 


                                        





                                 01/17/19 14:51 





                                 01/17/19 14:51 











Assessment and Plan


(1) ESRD (end stage renal disease)


Status: Acute   





(2) Heart failure


Status: Acute   





(3) Diabetes


Status: Chronic   





(4) Hypertension


Status: Chronic   





(5) Hypertensive emergency


Status: Acute

## 2019-01-18 NOTE — CP.PCM.PN
Subjective





- Date & Time of Evaluation


Date of Evaluation: 01/18/19


Time of Evaluation: 13:17





- Subjective


Subjective: 





Nephrology Consultation Note:





Assessment: stable


Acute Kidney Injury (N17.9) ? etiology. eval for GN such as ANCA. might be ESRD 


mild hyerkalemia and metabolic acidosis


fluid overload, possible CHF exacerbation EF 45%


Diabetic chronic Kidney Disease (E11.22) 


Hypertensive Chronic Kidney Disease (I12.9)


Chronic Kidney Disease (N18.3) Stage 3 with 8 gram proteinuria (R80.9) likely 

due to HTN/obesity


morbid obesity


hx of cavitary lung lesions in 2016





Plan


HD started 1/15/19. plan for HD Sat then monday


Hypertension control with meds as ordered. Maintain hemodynamics stable. Avoid 

hypotension. Patient not on ACEI/ARB due to recent BOBO


Monitor Input/Output, daily weights and renal function with basic metabolic 

panel


diuretics change to PO


cardiology pulmonary following


started flomax, vit d and phos binders, nephrovite


no plan for biopsy considering renal cortical thinning on usg and morbid 

obesity, CHF unless serology for GN + such as anca. 





Dose meds/antibiotics for reduced GFR. Avoid fleets enema/magnesium based laxati

ves. Avoid nephrotoxins/NSAIDs/ iodinated contrast (unless needed emergently)


Glycemic control


Further work up/management as per primary team


SW consult for outpt HD placement (accepted at [facility] TTS 3rd shift)


pt might be going to [facility] but his respi status not optimal yet hence will prefer 

another 1-2 sessions of HD before d/c. also awaiting GN serology which if neg, 

will request vascular surgery for AV access surgery





Thanks for allowing me to participate in care of your patient. Will follow 

patient with you. Please call if any Qs. had d/w team


Dr Anton Mcdaniel


Office: [phone] Cell: [phone] Fax: [phone]





Chief Complaint; SOB


Reason for consult: Acute Kidney Injury


HPI: Pt is a 58 M with hx of diabetes Mellitus ( 5years), hypertension (recent 

years) morbid obesity no regular medical follow up presented with complaints of 

worsening SOB on minimal exertion and on lying down. reported leg swelling. 

symptoms only for last few days as per pt. his serum cr 1.5-1.6 in 2016 also 

with cavitary lesion in lung that time. now cr 5 range


Denies OTC/herbal meds or NSAIDs


No recent iodinated contrast exposure. No obvious episodes of low BP. bP high in

200s when came


denies smoking/etoh or drugs





ROS: asking for breathing treatment


Cardiovascular: No chest pain. 


Pulmonary: shortness of breath still not significantly better as per pt. reports

cough and congestion


Gastrointestinal: denies abdominal pain No nausea. No vomiting. 


Genitourinary: No pain while urinating. Denies blood in urine. 


All other negative except as mentioned in HPI





Physical Examination:      


General Appearance: Comfortable, in no acute respiratory distress, co-operative 

. morbid obese


Vitals reviewed and noted as below


Head; Atraumatic, normocephalic


ENT: no ulcers no thrush. Tongue is midline. Oropharynx: no rash or ulcers.


EYES: Pupils are equal, round and reactive to light accommodation. Eye muscles 

and extraocular movement intact. Sclera is anicteric.


Neck; supple no lymphadenopathy, no thyromegaly or bruit


Lungs: normal respiratory rate/effort. Breath sounds bilateral reduced at bases 

with crackles +


Heart: Normal rate. s1s2 normal. No rub or gallop. 


Extremities: 3+ edema. No varicose veins


Neurological: Patient is alert, awake and oriented to person, place and time. No

focal deficit. Strength bilateral appropriate and equal


Skin: Warm and dry. Normal turgor. No rash. Palpitation: Normal elasticity for 

age


Abdomen: Abdomen is soft. Bowel sounds +. There is no abdominal tenderness, no 

guarding/rigidity no organomegaly


Psych: normal insight and normal affect/mood


MSK: no joint tenderness or swelling. Digits and nails normal, no deformity. 


: kidney or bladder not palpable


access: pc





Labs/imaging reviewed.


Past medical history, past surgical history, family history, social history, 

allergy reviewed and noted as below


Family hx: no hx of CKD. Rest non-contributory 





Objective





- Vital Signs/Intake and Output


Vital Signs (last 24 hours): 


                                        











Temp Pulse Resp BP Pulse Ox


 


 98.2 F   75   18   132/65   96 


 


 01/18/19 07:00  01/18/19 07:00  01/18/19 07:00  01/18/19 09:02  01/18/19 07:00








Intake and Output: 


                                        











 01/18/19 01/18/19





 06:59 18:59


 


Intake Total 480 


 


Balance 480 














- Medications


Medications: 


                               Current Medications





Dextrose (Dextrose 50% Inj)  0 ml IV STAT PRN; Protocol


   PRN Reason: Hypoglycemia Protocol


Dextrose (Glutose 15)  0 gm PO ONCE PRN; Protocol


   PRN Reason: Hypoglycemia Protocol


Ergocalciferol (Drisdol 50,000 Intl Units Cap)  1 cap PO Q7D [facility]


   Last Admin: 01/15/19 12:18 Dose:  1 cap


Furosemide (Lasix)  80 mg PO TTS [facility]


Glucagon (Glucagen Diagnostic Kit)  0 mg IM STAT PRN; Protocol


   PRN Reason: Hypoglycemia Protocol


Heparin Sodium (Porcine) (Heparin)  3,700 units IVP MWF [facility]


   Last Admin: 01/18/19 10:00 Dose:  Not Given


Hydralazine HCl (Apresoline)  50 mg PO BID [facility]


   Last Admin: 01/18/19 09:02 Dose:  50 mg


Insulin Aspart (Novolog)  0 unit SC ACHS [facility]; Protocol


   Last Admin: 01/18/19 11:35 Dose:  Not Given


Lisinopril (Zestril)  40 mg PO QPM [facility]


   Last Admin: 01/17/19 18:09 Dose:  Not Given


Metoprolol Tartrate (Lopressor)  50 mg PO BID [facility]


   Last Admin: 01/18/19 09:02 Dose:  50 mg


Sevelamer Carbonate (Renvela)  800 mg PO TIDCC [facility]


   Last Admin: 01/18/19 08:40 Dose:  800 mg


Tamsulosin HCl (Flomax)  0.4 mg PO DAILY [facility]


   Last Admin: 01/18/19 09:02 Dose:  0.4 mg


Vitamin B Complex/Vit C/Folic Acid (Nephro-Roxy)  1 tab PO 0800 [facility]


   Last Admin: 01/18/19 08:40 Dose:  1 tab











- Labs


Labs: 


                                        





                                 01/17/19 14:51 





                                 01/17/19 14:51

## 2019-01-19 LAB
BASOPHILS # BLD AUTO: 0.1 K/UL (ref 0–0.2)
BASOPHILS NFR BLD: 0.7 % (ref 0–2)
BUN SERPL-MCNC: 42 MG/DL (ref 9–20)
CALCIUM SERPL-MCNC: 7.4 MG/DL (ref 8.6–10.4)
EOSINOPHIL # BLD AUTO: 0.7 K/UL (ref 0–0.7)
EOSINOPHIL NFR BLD: 9.7 % (ref 0–4)
ERYTHROCYTE [DISTWIDTH] IN BLOOD BY AUTOMATED COUNT: 14.1 % (ref 11.5–14.5)
GFR NON-AFRICAN AMERICAN: 10
HGB BLD-MCNC: 9.7 G/DL (ref 12–18)
LYMPHOCYTES # BLD AUTO: 1.1 K/UL (ref 1–4.3)
LYMPHOCYTES NFR BLD AUTO: 15 % (ref 20–40)
MCH RBC QN AUTO: 30.3 PG (ref 27–31)
MCHC RBC AUTO-ENTMCNC: 33.1 G/DL (ref 33–37)
MCV RBC AUTO: 91.5 FL (ref 80–94)
MONOCYTES # BLD: 0.8 K/UL (ref 0–0.8)
MONOCYTES NFR BLD: 10 % (ref 0–10)
NEUTROPHILS # BLD: 4.9 K/UL (ref 1.8–7)
NEUTROPHILS NFR BLD AUTO: 64.6 % (ref 50–75)
NRBC BLD AUTO-RTO: 0 % (ref 0–2)
PLATELET # BLD: 225 K/UL (ref 130–400)
PMV BLD AUTO: 8.6 FL (ref 7.2–11.7)
RBC # BLD AUTO: 3.19 MIL/UL (ref 4.4–5.9)
WBC # BLD AUTO: 7.5 K/UL (ref 4.8–10.8)

## 2019-01-19 RX ADMIN — Medication SCH TAB: at 08:06

## 2019-01-19 RX ADMIN — INSULIN ASPART SCH UNITS: 100 INJECTION, SOLUTION INTRAVENOUS; SUBCUTANEOUS at 19:41

## 2019-01-19 RX ADMIN — IPRATROPIUM BROMIDE AND ALBUTEROL SULFATE SCH ML: .5; 3 SOLUTION RESPIRATORY (INHALATION) at 01:26

## 2019-01-19 RX ADMIN — IPRATROPIUM BROMIDE AND ALBUTEROL SULFATE SCH ML: .5; 3 SOLUTION RESPIRATORY (INHALATION) at 19:35

## 2019-01-19 RX ADMIN — FLUTICASONE FUROATE AND VILANTEROL TRIFENATATE SCH: 200; 25 POWDER RESPIRATORY (INHALATION) at 08:11

## 2019-01-19 RX ADMIN — IPRATROPIUM BROMIDE AND ALBUTEROL SULFATE SCH: .5; 3 SOLUTION RESPIRATORY (INHALATION) at 13:11

## 2019-01-19 RX ADMIN — INSULIN ASPART SCH: 100 INJECTION, SOLUTION INTRAVENOUS; SUBCUTANEOUS at 15:26

## 2019-01-19 RX ADMIN — IPRATROPIUM BROMIDE AND ALBUTEROL SULFATE SCH ML: .5; 3 SOLUTION RESPIRATORY (INHALATION) at 07:22

## 2019-01-19 RX ADMIN — INSULIN ASPART SCH: 100 INJECTION, SOLUTION INTRAVENOUS; SUBCUTANEOUS at 21:37

## 2019-01-19 RX ADMIN — INSULIN ASPART SCH: 100 INJECTION, SOLUTION INTRAVENOUS; SUBCUTANEOUS at 08:08

## 2019-01-19 NOTE — CP.PCM.CON
History of Present Illness





- History of Present Illness


History of Present Illness: 


Podiatry Consult Note for Dr. Lee: 


58 year old male patient, with PMHx fo HTN, DM, CHF, admitted for SOB, seen and 

evaluated for b/l foot abrasions. Patient states that both of his feet were 

scrapped a few days ago. He states that he is unsure how he scrapped his L foot,

but his R foot was scrapped when he was being transported from his home last 

week. 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 

and has not had any pedal issues since. He denies N/V/F/SOB/CP.





PMHx: As above


SHx: L 5th digit amp


ALL: Sulfa 








Review of Systems





- Review of Systems


Review of Systems: 





As per HPI 





Past Patient History





- Infectious Disease


Hx of Infectious Diseases: None





- Past Social History


Smoking Status: Never Smoked





- CARDIAC


Hx Hypertension: Yes





- PULMONARY


Hx Bronchitis: Yes





- NEUROLOGICAL


Hx Neurological Disorder: No





- HEENT


Hx HEENT Problems: No





- RENAL


Hx Chronic Kidney Disease: No





- ENDOCRINE/METABOLIC


Hx Diabetes Mellitus Type 1: Yes





- HEMATOLOGICAL/ONCOLOGICAL


Hx Blood Transfusions: No





- INTEGUMENTARY


Hx Dermatological Problems: No





- MUSCULOSKELETAL/RHEUMATOLOGICAL


Hx Falls: No





- GASTROINTESTINAL


Hx Gastrointestinal Disorders: No





- GENITOURINARY/GYNECOLOGICAL


Hx Genitourinary Disorders: No





- PSYCHIATRIC


Hx Substance Use: No





- SURGICAL HISTORY


Hx Surgeries: No





- ANESTHESIA


Hx Anesthesia: No


Hx Anesthesia Reactions: No


Hx Malignant Hyperthermia: No


Has any member of the family had a problem w/ anesthesia?: No





Meds


Allergies/Adverse Reactions: 


                                    Allergies











Allergy/AdvReac Type Severity Reaction Status Date / Time


 


Sulfa (Sulfonamide AdvReac   Verified 01/13/19 11:52





Antibiotics)     














- Medications


Medications: 


                               Current Medications





Albuterol/Ipratropium (Duoneb 3 Mg/0.5 Mg (3 Ml) Ud)  3 ml INH RQ6 [facility]


   Last Admin: 01/19/19 07:22 Dose:  3 ml


Dextrose (Dextrose 50% Inj)  0 ml IV STAT PRN; Protocol


   PRN Reason: Hypoglycemia Protocol


Dextrose (Glutose 15)  0 gm PO ONCE PRN; Protocol


   PRN Reason: Hypoglycemia Protocol


Ergocalciferol (Drisdol 50,000 Intl Units Cap)  1 cap PO Q7D [facility]


   Last Admin: 01/15/19 12:18 Dose:  1 cap


Fluticasone/Vilanterol (Breo Ellipta 200-25 Mcg Inh)  1 puff INH RQD [facility]


Furosemide (Lasix)  80 mg PO TTS [facility]


Glucagon (Glucagen Diagnostic Kit)  0 mg IM STAT PRN; Protocol


   PRN Reason: Hypoglycemia Protocol


Heparin Sodium (Porcine) (Heparin)  3,700 units IVP MWF [facility]


   Last Admin: 01/18/19 10:00 Dose:  Not Given


Hydralazine HCl (Apresoline)  50 mg PO BID [facility]


   Last Admin: 01/18/19 18:43 Dose:  50 mg


Insulin Aspart (Novolog)  0 unit SC ACHS [facility]; Protocol


   Last Admin: 01/19/19 08:08 Dose:  Not Given


Lisinopril (Zestril)  40 mg PO QPM [facility]


   Last Admin: 01/18/19 18:43 Dose:  40 mg


Metoprolol Tartrate (Lopressor)  50 mg PO BID [facility]


   Last Admin: 01/18/19 18:43 Dose:  50 mg


Sevelamer Carbonate (Renvela)  800 mg PO TIDCC [facility]


   Last Admin: 01/19/19 08:06 Dose:  800 mg


Tamsulosin HCl (Flomax)  0.4 mg PO DAILY [facility]


   Last Admin: 01/18/19 09:02 Dose:  0.4 mg


Vitamin B Complex/Vit C/Folic Acid (Nephro-Roxy)  1 tab PO 0800 [facility]


   Last Admin: 01/19/19 08:06 Dose:  1 tab











Physical Exam





- Constitutional


Appears: Non-toxic, No Acute Distress





- Head Exam


Head Exam: ATRAUMATIC, NORMOCEPHALIC





- Extremities Exam


Additional comments: 





B/L Lower extremity exam: 


Vasc: DP/PT palpable, CFT < 3 seconds, TG warm to warm, + 2 edema appreciated to

b/l legs beginning at tibial tuberosity and extending to distal forefoot. 


Ortho: No pain with palpation upon calf compression, MMT 5/5 in all 

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 


Neuro:  Gross sensation intact, protective sensation diminished


Derm: Superficial abrasion noted to the dorsal aspect of the L 4th digit, no 

purulence, mild erythema noted grace-wound, no drainage at this time, no clinical

signs of infection. Additional superficial abrasions appreciated to the R dorsal

aspect of hallux, 2nd digit, and 3rd digit, no purulence, mild erythema 

appreciated periwound, no purulence, no clinical signs of infection.





- Neurological Exam


Neurological exam: Alert, Oriented x3





- Psychiatric Exam


Psychiatric exam: Normal Affect, Normal Mood





Results





- Vital Signs


Recent Vital Signs: 


                                Last Vital Signs











Temp  98.1 F   01/19/19 07:00


 


Pulse  90   01/19/19 09:30


 


Resp  18   01/19/19 07:00


 


BP  144/69   01/19/19 07:00


 


Pulse Ox  96   01/19/19 07:00














- Labs


Result Diagrams: 


                                 01/19/19 07:19





                                 01/19/19 07:19


Labs: 


                         Laboratory Results - last 24 hr











  01/14/19 01/18/19 01/18/19





  17:06 11:18 17:29


 


WBC   


 


RBC   


 


Hgb   


 


Hct   


 


MCV   


 


MCH   


 


MCHC   


 


RDW   


 


Plt Count   


 


MPV   


 


Neut % (Auto)   


 


Lymph % (Auto)   


 


Mono % (Auto)   


 


Eos % (Auto)   


 


Baso % (Auto)   


 


Neut # (Auto)   


 


Lymph # (Auto)   


 


Mono # (Auto)   


 


Eos # (Auto)   


 


Baso # (Auto)   


 


Sodium   


 


Potassium   


 


Chloride   


 


Carbon Dioxide   


 


Anion Gap   


 


BUN   


 


Creatinine   


 


Est GFR ( Amer)   


 


Est GFR (Non-Af Amer)   


 


POC Glucose (mg/dL)   113 H  122 H


 


Random Glucose   


 


Calcium   


 


Phosphorus   


 


ANCA Screen  Negative  


 


c-ANCA Titer  TNP  


 


Proteinase 3 (PR3)  <1.0  


 


p-ANCA Titer  TNP  


 


Atypical p-ANCA Titer  TNP  


 


Myeloperoxidase Ab  <1.0  














  01/18/19 01/19/19 01/19/19





  21:41 06:28 07:19


 


WBC    7.5


 


RBC    3.19 L


 


Hgb    9.7 L


 


Hct    29.2 L


 


MCV    91.5


 


MCH    30.3


 


MCHC    33.1


 


RDW    14.1


 


Plt Count    225


 


MPV    8.6


 


Neut % (Auto)    64.6


 


Lymph % (Auto)    15.0 L


 


Mono % (Auto)    10.0


 


Eos % (Auto)    9.7 H


 


Baso % (Auto)    0.7


 


Neut # (Auto)    4.9


 


Lymph # (Auto)    1.1


 


Mono # (Auto)    0.8


 


Eos # (Auto)    0.7


 


Baso # (Auto)    0.1


 


Sodium   


 


Potassium   


 


Chloride   


 


Carbon Dioxide   


 


Anion Gap   


 


BUN   


 


Creatinine   


 


Est GFR ( Amer)   


 


Est GFR (Non-Af Amer)   


 


POC Glucose (mg/dL)  118 H  109 


 


Random Glucose   


 


Calcium   


 


Phosphorus   


 


ANCA Screen   


 


c-ANCA Titer   


 


Proteinase 3 (PR3)   


 


p-ANCA Titer   


 


Atypical p-ANCA Titer   


 


Myeloperoxidase Ab   














  01/19/19





  07:19


 


WBC 


 


RBC 


 


Hgb 


 


Hct 


 


MCV 


 


MCH 


 


MCHC 


 


RDW 


 


Plt Count 


 


MPV 


 


Neut % (Auto) 


 


Lymph % (Auto) 


 


Mono % (Auto) 


 


Eos % (Auto) 


 


Baso % (Auto) 


 


Neut # (Auto) 


 


Lymph # (Auto) 


 


Mono # (Auto) 


 


Eos # (Auto) 


 


Baso # (Auto) 


 


Sodium  138


 


Potassium  4.2


 


Chloride  104


 


Carbon Dioxide  26


 


Anion Gap  12


 


BUN  42 H


 


Creatinine  5.7 H


 


Est GFR ( Amer)  12


 


Est GFR (Non-Af Amer)  10


 


POC Glucose (mg/dL) 


 


Random Glucose  127 H


 


Calcium  7.4 L


 


Phosphorus  6.2 H


 


ANCA Screen 


 


c-ANCA Titer 


 


Proteinase 3 (PR3) 


 


p-ANCA Titer 


 


Atypical p-ANCA Titer 


 


Myeloperoxidase Ab 














Assessment & Plan





- Assessment and Plan (Free Text)


Assessment: 





58 year old male patient, with for b/l foot abrasions; stable and not infected 

at this time 


Plan: 


Patient seen and evaluated with Dr. Lee 


VSS, WBC 7.5


Local wound care: Mupirocin, xeroform, DSD to be applied to abrasion sites daily




Mupirocin ointment ordered to be applied daily


Will continue to follow


Thank you for the consult





- Date & Time


Date: 01/19/19


Time: 10:01

## 2019-01-19 NOTE — CP.PCM.PN
Subjective





- Date & Time of Evaluation


Date of Evaluation: 01/19/19


Time of Evaluation: 16:44





Objective





- Vital Signs/Intake and Output


Vital Signs (last 24 hours): 


                                        











Temp Pulse Resp BP Pulse Ox


 


 98.6 F   81   20   167/81 H  99 


 


 01/19/19 15:33  01/19/19 15:33  01/19/19 15:33  01/19/19 15:33  01/19/19 15:33








Intake and Output: 


                                        











 01/19/19 01/19/19





 06:59 18:59


 


Intake Total 240 


 


Balance 240 














- Medications


Medications: 


                               Current Medications





Albuterol/Ipratropium (Duoneb 3 Mg/0.5 Mg (3 Ml) Ud)  3 ml INH RQ6 [facility]


   Last Admin: 01/19/19 13:11 Dose:  Not Given


Dextrose (Dextrose 50% Inj)  0 ml IV STAT PRN; Protocol


   PRN Reason: Hypoglycemia Protocol


Dextrose (Glutose 15)  0 gm PO ONCE PRN; Protocol


   PRN Reason: Hypoglycemia Protocol


Ergocalciferol (Drisdol 50,000 Intl Units Cap)  1 cap PO Q7D [facility]


   Last Admin: 01/15/19 12:18 Dose:  1 cap


Fluticasone/Vilanterol (Breo Ellipta 200-25 Mcg Inh)  1 puff INH RQD [facility]


   Last Admin: 01/19/19 08:11 Dose:  Not Given


Furosemide (Lasix)  80 mg PO TTS [facility]


Glucagon (Glucagen Diagnostic Kit)  0 mg IM STAT PRN; Protocol


   PRN Reason: Hypoglycemia Protocol


Heparin Sodium (Porcine) (Heparin)  3,700 units IVP MWF [facility]


   Last Admin: 01/18/19 10:00 Dose:  Not Given


Hydralazine HCl (Apresoline)  50 mg PO BID [facility]


   Last Admin: 01/19/19 10:00 Dose:  Not Given


Insulin Aspart (Novolog)  0 unit SC ACHS [facility]; Protocol


   Last Admin: 01/19/19 15:26 Dose:  Not Given


Lisinopril (Zestril)  40 mg PO QPM [facility]


   Last Admin: 01/18/19 18:43 Dose:  40 mg


Metoprolol Tartrate (Lopressor)  50 mg PO BID [facility]


   Last Admin: 01/19/19 10:00 Dose:  Not Given


Mupirocin (Bactroban Ointment)  0 gm TOP DAILY [facility]


Sevelamer Carbonate (Renvela)  800 mg PO TIDCC [facility]


   Last Admin: 01/19/19 15:27 Dose:  Not Given


Tamsulosin HCl (Flomax)  0.4 mg PO DAILY [facility]


   Last Admin: 01/19/19 10:00 Dose:  Not Given


Vitamin B Complex/Vit C/Folic Acid (Nephro-Roxy)  1 tab PO 0800 [facility]


   Last Admin: 01/19/19 08:06 Dose:  1 tab











- Labs


Labs: 


                                        





                                 01/19/19 07:19 





                                 01/19/19 07:19 











Assessment and Plan


(1) ESRD (end stage renal disease)


Status: Acute   





(2) Heart failure


Status: Acute   





(3) Diabetes


Status: Chronic   





(4) Hypertension


Status: Chronic   





(5) Hypertensive emergency


Status: Acute

## 2019-01-20 RX ADMIN — IPRATROPIUM BROMIDE AND ALBUTEROL SULFATE SCH: .5; 3 SOLUTION RESPIRATORY (INHALATION) at 03:16

## 2019-01-20 RX ADMIN — IPRATROPIUM BROMIDE AND ALBUTEROL SULFATE SCH ML: .5; 3 SOLUTION RESPIRATORY (INHALATION) at 19:10

## 2019-01-20 RX ADMIN — IPRATROPIUM BROMIDE AND ALBUTEROL SULFATE SCH ML: .5; 3 SOLUTION RESPIRATORY (INHALATION) at 13:57

## 2019-01-20 RX ADMIN — INSULIN ASPART SCH: 100 INJECTION, SOLUTION INTRAVENOUS; SUBCUTANEOUS at 12:05

## 2019-01-20 RX ADMIN — FLUTICASONE FUROATE AND VILANTEROL TRIFENATATE SCH: 200; 25 POWDER RESPIRATORY (INHALATION) at 13:57

## 2019-01-20 RX ADMIN — INSULIN ASPART SCH: 100 INJECTION, SOLUTION INTRAVENOUS; SUBCUTANEOUS at 12:10

## 2019-01-20 RX ADMIN — INSULIN ASPART SCH: 100 INJECTION, SOLUTION INTRAVENOUS; SUBCUTANEOUS at 21:32

## 2019-01-20 RX ADMIN — IPRATROPIUM BROMIDE AND ALBUTEROL SULFATE SCH ML: .5; 3 SOLUTION RESPIRATORY (INHALATION) at 07:36

## 2019-01-20 RX ADMIN — INSULIN ASPART SCH UNITS: 100 INJECTION, SOLUTION INTRAVENOUS; SUBCUTANEOUS at 17:14

## 2019-01-20 RX ADMIN — INSULIN ASPART SCH: 100 INJECTION, SOLUTION INTRAVENOUS; SUBCUTANEOUS at 08:21

## 2019-01-20 RX ADMIN — Medication SCH TAB: at 08:10

## 2019-01-20 NOTE — CT
Date of service: 



01/20/2019



PROCEDURE:  CT Chest without contrast



HISTORY:

History of Cavitary lesion



COMPARISON:

2/5/2016



TECHNIQUE:

Contiguous axial images were obtained through the chest without 

intravenous contrast enhancement. Sagittal and coronal 

reconstructions were performed.







Radiation dose (DLP): 1116.7  mGy-cm. 



This CT exam was performed using one or more of the following dose 

reduction techniques: Automated exposure control, adjustment of the 

mA and/or kV according to patient size, and/or use of iterative 

reconstruction technique.



FINDINGS:



LUNGS:

 No acute infiltrate. Linear scar/atelectasis in right lower lobe. 

Minimal linear scar/atelectasis in left lower lobe no pulmonary mass. 

Cavitary lesions seen previously in lingula and left lower lobe on 

examination of 2/5/2016 are no longer evident. Previously identified 

irregular opacity in left apex has resolved. 



MEDIASTINUM:

Unremarkable thoracic aorta. No aneurysm. Normal sized heart. Main 

pulmonary artery unremarkable. No vascular congestion. There is mild 

mediastinal lymphadenopathy. Multiple subcentimeter mediastinal nodes 

are noted as well. No definite hilar lymphadenopathy though 

evaluation is limited by the lack of intravenous contrast 

administration. There is a tunneled right central venous dialysis 

catheter present 



There is atherosclerotic calcification of the visualized upper 

abdominal aorta.



PLEURA:  Trace right pleural effusion. No left pleural effusion. No 

pneumothorax.



BONES:  No fracture. No destructive lesion. 



UPPER ABDOMEN:  Several tiny nonobstructing left renal calculi.



OTHER FINDINGS:  Bilateral gynecomastia



IMPRESSION:

Previously identified cavitary lesions in the lingula and left lower 

lobe have resolved. Bilateral lower lobe linear scar/atelectasis and 

trace right pleural effusion. Mild mediastinal lymphadenopathy. 

Bilateral gynecomastia. Tiny nonobstructing left renal calculi.

## 2019-01-20 NOTE — CP.PCM.PN
Subjective





- Date & Time of Evaluation


Date of Evaluation: 01/20/19


Time of Evaluation: 08:58





- Subjective


Subjective: 





Vascular Surgery: Dr. Menchaca





Pt seen and examined. No acute overnight events. States he feels ok and denies 

complaints at this time. Denies fevers/chills. 





Objective





- Vital Signs/Intake and Output


Vital Signs (last 24 hours): 


                                        











Temp Pulse Resp BP Pulse Ox


 


 97.8 F   78   18   139/68   94 L


 


 01/20/19 07:00  01/20/19 07:00  01/20/19 07:00  01/20/19 07:00  01/20/19 07:00








Intake and Output: 


                                        











 01/20/19 01/20/19





 06:59 18:59


 


Intake Total 320 


 


Balance 320 














- Medications


Medications: 


                               Current Medications





Albuterol/Ipratropium (Duoneb 3 Mg/0.5 Mg (3 Ml) Ud)  3 ml INH RQ6 [facility]


   Last Admin: 01/20/19 07:36 Dose:  3 ml


Dextrose (Dextrose 50% Inj)  0 ml IV STAT PRN; Protocol


   PRN Reason: Hypoglycemia Protocol


Dextrose (Glutose 15)  0 gm PO ONCE PRN; Protocol


   PRN Reason: Hypoglycemia Protocol


Ergocalciferol (Drisdol 50,000 Intl Units Cap)  1 cap PO Q7D [facility]


   Last Admin: 01/15/19 12:18 Dose:  1 cap


Fluticasone/Vilanterol (Breo Ellipta 200-25 Mcg Inh)  1 puff INH RQD [facility]


   Last Admin: 01/19/19 08:11 Dose:  Not Given


Furosemide (Lasix)  80 mg PO TTS [facility]


Glucagon (Glucagen Diagnostic Kit)  0 mg IM STAT PRN; Protocol


   PRN Reason: Hypoglycemia Protocol


Heparin Sodium (Porcine) (Heparin)  3,700 units IVP MWF [facility]


   Last Admin: 01/18/19 10:00 Dose:  Not Given


Hydralazine HCl (Apresoline)  50 mg PO BID [facility]


   Last Admin: 01/19/19 18:52 Dose:  50 mg


Insulin Aspart (Novolog)  0 unit SC ACHS [facility]; Protocol


   Last Admin: 01/19/19 21:37 Dose:  Not Given


Lisinopril (Zestril)  40 mg PO QPM [facility]


   Last Admin: 01/19/19 18:52 Dose:  40 mg


Metoprolol Tartrate (Lopressor)  50 mg PO BID [facility]


   Last Admin: 01/19/19 18:52 Dose:  50 mg


Mupirocin (Bactroban Ointment)  0 gm TOP DAILY [facility]


Sevelamer Carbonate (Renvela)  800 mg PO TIDCC [facility]


   Last Admin: 01/19/19 18:50 Dose:  800 mg


Tamsulosin HCl (Flomax)  0.4 mg PO DAILY [facility]


   Last Admin: 01/19/19 10:00 Dose:  Not Given


Vitamin B Complex/Vit C/Folic Acid (Nephro-Roxy)  1 tab PO 0800 [facility]


   Last Admin: 01/19/19 08:06 Dose:  1 tab











- Labs


Labs: 


                                        





                                 01/19/19 07:19 





                                 01/19/19 07:19 











- Constitutional


Appears: No Acute Distress





- Head Exam


Head Exam: ATRAUMATIC, NORMOCEPHALIC





- ENT Exam


ENT Exam: Mucous Membranes Moist





- Respiratory Exam


Respiratory Exam: NORMAL BREATHING PATTERN





- Cardiovascular Exam


Cardiovascular Exam: RRR





- Neurological Exam


Neurological Exam: Alert, Awake, Oriented x3





- Skin


Skin Exam: Dry, Warm





Assessment and Plan





- Assessment and Plan (Free Text)


Assessment: 





58M with ESRD on HD


Plan: 





- L arm AVF creation tomorrow morning


- Keep NPO past midnight


- d/w Dr. Bernarda Pillai

## 2019-01-20 NOTE — CP.PCM.PN
Subjective





- Date & Time of Evaluation


Date of Evaluation: 01/20/19


Time of Evaluation: 13:02





Objective





- Vital Signs/Intake and Output


Vital Signs (last 24 hours): 


                                        











Temp Pulse Resp BP Pulse Ox


 


 97.8 F   78   18   139/68   94 L


 


 01/20/19 07:00  01/20/19 07:00  01/20/19 07:00  01/20/19 07:00  01/20/19 07:00








Intake and Output: 


                                        











 01/20/19 01/20/19





 06:59 18:59


 


Intake Total 320 


 


Balance 320 














- Medications


Medications: 


                               Current Medications





Albuterol/Ipratropium (Duoneb 3 Mg/0.5 Mg (3 Ml) Ud)  3 ml INH RQ6 [facility]


   Last Admin: 01/20/19 07:36 Dose:  3 ml


Dextrose (Dextrose 50% Inj)  0 ml IV STAT PRN; Protocol


   PRN Reason: Hypoglycemia Protocol


Dextrose (Glutose 15)  0 gm PO ONCE PRN; Protocol


   PRN Reason: Hypoglycemia Protocol


Ergocalciferol (Drisdol 50,000 Intl Units Cap)  1 cap PO Q7D [facility]


   Last Admin: 01/15/19 12:18 Dose:  1 cap


Fluticasone/Vilanterol (Breo Ellipta 200-25 Mcg Inh)  1 puff INH RQD [facility]


   Last Admin: 01/19/19 08:11 Dose:  Not Given


Furosemide (Lasix)  80 mg PO TTS [facility]


Glucagon (Glucagen Diagnostic Kit)  0 mg IM STAT PRN; Protocol


   PRN Reason: Hypoglycemia Protocol


Heparin Sodium (Porcine) (Heparin)  3,700 units IVP MWF [facility]


   Last Admin: 01/18/19 10:00 Dose:  Not Given


Hydralazine HCl (Apresoline)  50 mg PO BID [facility]


   Last Admin: 01/20/19 10:50 Dose:  50 mg


Insulin Aspart (Novolog)  0 unit SC ACHS [facility]; Protocol


   Last Admin: 01/20/19 08:21 Dose:  Not Given


Lisinopril (Zestril)  40 mg PO QPM [facility]


   Last Admin: 01/19/19 18:52 Dose:  40 mg


Metoprolol Tartrate (Lopressor)  50 mg PO BID [facility]


   Last Admin: 01/20/19 10:50 Dose:  50 mg


Mupirocin (Bactroban Ointment)  0 gm TOP DAILY [facility]


Sevelamer Carbonate (Renvela)  800 mg PO TIDCC [facility]


   Last Admin: 01/20/19 08:10 Dose:  800 mg


Tamsulosin HCl (Flomax)  0.4 mg PO DAILY [facility]


   Last Admin: 01/20/19 10:50 Dose:  0.4 mg


Vitamin B Complex/Vit C/Folic Acid (Nephro-Roxy)  1 tab PO 0800 [facility]


   Last Admin: 01/20/19 08:10 Dose:  1 tab











- Labs


Labs: 


                                        





                                 01/19/19 07:19 





                                 01/19/19 07:19 











Assessment and Plan


(1) ESRD (end stage renal disease)


Status: Acute   





(2) Heart failure


Status: Acute   





(3) Diabetes


Status: Chronic   





(4) Hypertension


Status: Chronic   





(5) Hypertensive emergency


Status: Acute

## 2019-01-20 NOTE — CP.PCM.PN
Subjective





- Date & Time of Evaluation


Date of Evaluation: 01/20/19


Time of Evaluation: 12:52





- Subjective


Subjective: 





Nephrology Consultation Note:





Assessment: stable


Acute Kidney Injury (N17.9) ? etiology. eval for GN negative thus far


mild hyerkalemia and metabolic acidosis


fluid overload, possible CHF exacerbation EF 45%


Diabetic chronic Kidney Disease (E11.22) 


Hypertensive Chronic Kidney Disease (I12.9)


Chronic Kidney Disease (N18.3) Stage 3 with 8 gram proteinuria (R80.9) likely 

due to HTN/obesity


morbid obesity


hx of cavitary lung lesions in 2016





Plan


HD started 1/15/19. HD next on monday


bp stable


cardiology pulmonary following


on flomax, vit d and phos binders, nephrovite


no plan for biopsy considering renal cortical thinning on usg and morbid 

obesity, CHF unless serology for GN + such as anca. 


f/u serologies thus far negative 


Dose meds/antibiotics for reduced GFR. Avoid fleets enema/magnesium based 

laxatives. Avoid nephrotoxins/NSAIDs/ iodinated contrast (unless needed 

emergently)


Glycemic control


SW consult for outpt HD placement (accepted at [facility] TTS 3rd shift)











s: seen and examiend comlpains of some pain in his back otherwise feels ok





Physical Examination:      


General Appearance: Comfortable, in no acute respiratory distress, co-operative 

. morbid obese


Vitals reviewed and noted as below


Head; Atraumatic, normocephalic


ENT: no ulcers no thrush. Tongue is midline. Oropharynx: no rash or ulcers.


EYES: Pupils are equal, round and reactive to light accommodation. Eye muscles 

and extraocular movement intact. Sclera is anicteric.


Neck; supple no lymphadenopathy, no thyromegaly or bruit


Lungs: normal respiratory rate/effort. Breath sounds bilateral reduced at bases 


Heart: Normal rate. s1s2 normal. No rub or gallop. 


Extremities: 3+ edema. No varicose veins


Neurological: Patient is alert, awake and oriented to person, place and time. No

focal deficit. Strength bilateral appropriate and equal


Skin: Warm and dry. Normal turgor. No rash. Palpitation: Normal elasticity for 

age


Abdomen: Abdomen is soft. Bowel sounds +. There is no abdominal tenderness, no 

guarding/rigidity no organomegaly


Psych: normal insight and normal affect/mood


MSK: no joint tenderness or swelling. Digits and nails normal, no deformity. 


: kidney or bladder not palpable


access: pc





Labs/imaging reviewed.


Past medical history, past surgical history, family history, social history, 

allergy reviewed and noted as below


Family hx: no hx of CKD. Rest non-contributory 








Objective





- Vital Signs/Intake and Output


Vital Signs (last 24 hours): 


                                        











Temp Pulse Resp BP Pulse Ox


 


 97.8 F   78   18   139/68   94 L


 


 01/20/19 07:00  01/20/19 07:00  01/20/19 07:00  01/20/19 07:00  01/20/19 07:00








Intake and Output: 


                                        











 01/20/19 01/20/19





 06:59 18:59


 


Intake Total 320 


 


Balance 320 














- Medications


Medications: 


                               Current Medications





Albuterol/Ipratropium (Duoneb 3 Mg/0.5 Mg (3 Ml) Ud)  3 ml INH RQ6 [facility]


   Last Admin: 01/20/19 07:36 Dose:  3 ml


Dextrose (Dextrose 50% Inj)  0 ml IV STAT PRN; Protocol


   PRN Reason: Hypoglycemia Protocol


Dextrose (Glutose 15)  0 gm PO ONCE PRN; Protocol


   PRN Reason: Hypoglycemia Protocol


Ergocalciferol (Drisdol 50,000 Intl Units Cap)  1 cap PO Q7D [facility]


   Last Admin: 01/15/19 12:18 Dose:  1 cap


Fluticasone/Vilanterol (Breo Ellipta 200-25 Mcg Inh)  1 puff INH RQD [facility]


   Last Admin: 01/19/19 08:11 Dose:  Not Given


Furosemide (Lasix)  80 mg PO TTS [facility]


Glucagon (Glucagen Diagnostic Kit)  0 mg IM STAT PRN; Protocol


   PRN Reason: Hypoglycemia Protocol


Heparin Sodium (Porcine) (Heparin)  3,700 units IVP MWF [facility]


   Last Admin: 01/18/19 10:00 Dose:  Not Given


Hydralazine HCl (Apresoline)  50 mg PO BID [facility]


   Last Admin: 01/20/19 10:50 Dose:  50 mg


Insulin Aspart (Novolog)  0 unit SC ACHS [facility]; Protocol


   Last Admin: 01/20/19 08:21 Dose:  Not Given


Lisinopril (Zestril)  40 mg PO QPM [facility]


   Last Admin: 01/19/19 18:52 Dose:  40 mg


Metoprolol Tartrate (Lopressor)  50 mg PO BID [facility]


   Last Admin: 01/20/19 10:50 Dose:  50 mg


Mupirocin (Bactroban Ointment)  0 gm TOP DAILY [facility]


Sevelamer Carbonate (Renvela)  800 mg PO TIDCC [facility]


   Last Admin: 01/20/19 08:10 Dose:  800 mg


Tamsulosin HCl (Flomax)  0.4 mg PO DAILY [facility]


   Last Admin: 01/20/19 10:50 Dose:  0.4 mg


Vitamin B Complex/Vit C/Folic Acid (Nephro-Roxy)  1 tab PO 0800 [facility]


   Last Admin: 01/20/19 08:10 Dose:  1 tab











- Labs


Labs: 


                                        





                                 01/19/19 07:19 





                                 01/19/19 07:19

## 2019-01-21 LAB
APTT BLD: 21 SECONDS (ref 21–34)
BUN SERPL-MCNC: 47 MG/DL (ref 9–20)
CALCIUM SERPL-MCNC: 7.4 MG/DL (ref 8.6–10.4)
ERYTHROCYTE [DISTWIDTH] IN BLOOD BY AUTOMATED COUNT: 13.7 % (ref 11.5–14.5)
GFR NON-AFRICAN AMERICAN: 9
HGB BLD-MCNC: 9.3 G/DL (ref 12–18)
INR PPP: 1
MCH RBC QN AUTO: 30.5 PG (ref 27–31)
MCHC RBC AUTO-ENTMCNC: 32.9 G/DL (ref 33–37)
MCV RBC AUTO: 92.6 FL (ref 80–94)
PLATELET # BLD: 141 K/UL (ref 130–400)
PMV BLD AUTO: 9.1 FL (ref 7.2–11.7)
PROTHROMBIN TIME: 11.3 SECONDS (ref 9.7–12.2)
RBC # BLD AUTO: 3.05 MIL/UL (ref 4.4–5.9)
WBC # BLD AUTO: 8.9 K/UL (ref 4.8–10.8)

## 2019-01-21 RX ADMIN — INSULIN ASPART SCH UNITS: 100 INJECTION, SOLUTION INTRAVENOUS; SUBCUTANEOUS at 17:43

## 2019-01-21 RX ADMIN — SODIUM CHLORIDE SCH: 9 INJECTION, SOLUTION INTRAMUSCULAR; INTRAVENOUS; SUBCUTANEOUS at 10:03

## 2019-01-21 RX ADMIN — INSULIN ASPART SCH: 100 INJECTION, SOLUTION INTRAVENOUS; SUBCUTANEOUS at 12:47

## 2019-01-21 RX ADMIN — IPRATROPIUM BROMIDE AND ALBUTEROL SULFATE SCH ML: .5; 3 SOLUTION RESPIRATORY (INHALATION) at 20:22

## 2019-01-21 RX ADMIN — IPRATROPIUM BROMIDE AND ALBUTEROL SULFATE SCH ML: .5; 3 SOLUTION RESPIRATORY (INHALATION) at 13:20

## 2019-01-21 RX ADMIN — FLUTICASONE FUROATE AND VILANTEROL TRIFENATATE SCH PUFF: 200; 25 POWDER RESPIRATORY (INHALATION) at 13:20

## 2019-01-21 RX ADMIN — SODIUM CHLORIDE SCH UNITS: 9 INJECTION, SOLUTION INTRAMUSCULAR; INTRAVENOUS; SUBCUTANEOUS at 10:56

## 2019-01-21 RX ADMIN — INSULIN ASPART SCH: 100 INJECTION, SOLUTION INTRAVENOUS; SUBCUTANEOUS at 12:48

## 2019-01-21 RX ADMIN — ERYTHROPOIETIN SCH UNIT: 4000 INJECTION, SOLUTION INTRAVENOUS; SUBCUTANEOUS at 11:04

## 2019-01-21 RX ADMIN — INSULIN ASPART SCH: 100 INJECTION, SOLUTION INTRAVENOUS; SUBCUTANEOUS at 21:30

## 2019-01-21 RX ADMIN — IPRATROPIUM BROMIDE AND ALBUTEROL SULFATE SCH: .5; 3 SOLUTION RESPIRATORY (INHALATION) at 03:00

## 2019-01-21 RX ADMIN — Medication SCH: at 12:47

## 2019-01-21 RX ADMIN — IPRATROPIUM BROMIDE AND ALBUTEROL SULFATE SCH ML: .5; 3 SOLUTION RESPIRATORY (INHALATION) at 07:20

## 2019-01-21 NOTE — CP.PCM.PN
Subjective





- Date & Time of Evaluation


Date of Evaluation: 01/21/19


Time of Evaluation: 13:29





- Subjective


Subjective: 





Nephrology Consultation Note:





Assessment: stable


Acute Kidney Injury (N17.9) ? etiology. eval for GN such as ANCA. likely ESRD 


mild hyerkalemia and metabolic acidosis


fluid overload, possible CHF exacerbation EF 45%


Diabetic chronic Kidney Disease (E11.22) 


Hypertensive Chronic Kidney Disease (I12.9)


Chronic Kidney Disease (N18.3) Stage 3 with 8 gram proteinuria (R80.9) likely 

due to HTN/obesity


morbid obesity


hx of cavitary lung lesions in 2016, resolved on follow up Chest Ct





Plan


HD started 1/15/19. plan for HD today as MWF


Hypertension control with meds as ordered. Maintain hemodynamics stable. pt 

started on lisinopril


cardiology pulmonary following


started flomax, vit d and phos binders, nephrovite


no plan for biopsy considering renal cortical thinning on usg and morbid 

obesity, CHF and serology for GN + such as anca. 





Dose meds/antibiotics for reduced GFR. Avoid fleets enema/magnesium based 

laxatives. Avoid nephrotoxins/NSAIDs/ iodinated contrast (unless needed 

emergently)


Glycemic control


Further work up/management as per primary team


SW consult for outpt HD placement (accepted at [facility] TTS 3rd shift)


pt might be going to [facility] requested vascular surgery for AV access surgery before

d/c, plan for tomorrow





Thanks for allowing me to participate in care of your patient. Will follow 

patient with you. Please call if any Qs. had d/w team


Dr Anton Mcdaniel


Office: [phone] Cell: [phone] Fax: [phone]





Chief Complaint; SOB


Reason for consult: Acute Kidney Injury


HPI: Pt is a 58 M with hx of diabetes Mellitus ( 5years), hypertension (recent 

years) morbid obesity no regular medical follow up presented with complaints of 

worsening SOB on minimal exertion and on lying down. reported leg swelling. 

symptoms only for last few days as per pt. his serum cr 1.5-1.6 in 2016 also 

with cavitary lesion in lung that time. now cr 5 range


Denies OTC/herbal meds or NSAIDs


No recent iodinated contrast exposure. No obvious episodes of low BP. bP high in

200s when came


denies smoking/etoh or drugs





ROS: 


Cardiovascular: No chest pain. 


Pulmonary: shortness of breath better


Gastrointestinal: denies abdominal pain No nausea. No vomiting. 


Genitourinary: No pain while urinating. Denies blood in urine. 


All other negative except as mentioned in HPI





Physical Examination:      seen on HD


General Appearance: Comfortable, in no acute respiratory distress, co-operative 

. morbid obese


Vitals reviewed and noted as below


Head; Atraumatic, normocephalic


ENT: no ulcers no thrush. Tongue is midline. Oropharynx: no rash or ulcers.


EYES: Pupils are equal, round and reactive to light accommodation. Eye muscles 

and extraocular movement intact. Sclera is anicteric.


Neck; supple no lymphadenopathy, no thyromegaly or bruit


Lungs: normal respiratory rate/effort. Breath sounds bilateral reduced at bases 

with crackles +


Heart: Normal rate. s1s2 normal. No rub or gallop. 


Extremities: 3+ edema. No varicose veins


Neurological: Patient is alert, awake and oriented to person, place and time. No

focal deficit. Strength bilateral appropriate and equal


Skin: Warm and dry. Normal turgor. No rash. Palpitation: Normal elasticity for 

age


Abdomen: Abdomen is soft. Bowel sounds +. There is no abdominal tenderness, no 

guarding/rigidity no organomegaly


Psych: normal insight and normal affect/mood


MSK: no joint tenderness or swelling. Digits and nails normal, no deformity. 


: kidney or bladder not palpable


access: pc





Labs/imaging reviewed.


Past medical history, past surgical history, family history, social history, 

allergy reviewed and noted as below


Family hx: no hx of CKD. Rest non-contributory 








Objective





- Vital Signs/Intake and Output


Vital Signs (last 24 hours): 


                                        











Temp Pulse Resp BP Pulse Ox


 


 97.9 F   76   17   156/78 H  98 


 


 01/21/19 09:00  01/21/19 09:00  01/21/19 09:00  01/21/19 12:00  01/21/19 09:00








Intake and Output: 


                                        











 01/21/19 01/21/19





 06:59 18:59


 


Intake Total 150 


 


Balance 150 














- Medications


Medications: 


                               Current Medications





Albuterol/Ipratropium (Duoneb 3 Mg/0.5 Mg (3 Ml) Ud)  3 ml INH RQ6 [facility]


   Last Admin: 01/21/19 13:20 Dose:  3 ml


Dextrose (Dextrose 50% Inj)  0 ml IV STAT PRN; Protocol


   PRN Reason: Hypoglycemia Protocol


Dextrose (Glutose 15)  0 gm PO ONCE PRN; Protocol


   PRN Reason: Hypoglycemia Protocol


Epoetin Florentino (Procrit)  8,000 unit IV [facility]


   Last Admin: 01/21/19 11:04 Dose:  8,000 unit


Ergocalciferol (Drisdol 50,000 Intl Units Cap)  1 cap PO Q7D [facility]


   Last Admin: 01/15/19 12:18 Dose:  1 cap


Fluticasone/Vilanterol (Breo Ellipta 200-25 Mcg Inh)  1 puff INH RQD [facility]


   Last Admin: 01/21/19 13:20 Dose:  1 puff


Furosemide (Lasix)  80 mg PO TTS [facility]


Glucagon (Glucagen Diagnostic Kit)  0 mg IM STAT PRN; Protocol


   PRN Reason: Hypoglycemia Protocol


Heparin Sodium (Porcine) (Heparin)  3,700 units IVP [facility]


   Last Admin: 01/21/19 10:56 Dose:  3,700 units


Hydralazine HCl (Apresoline)  50 mg PO BID [facility]


   Last Admin: 01/21/19 10:02 Dose:  Not Given


Insulin Aspart (Novolog)  0 unit SC [facility]; Protocol


   Last Admin: 01/21/19 12:48 Dose:  Not Given


Lisinopril (Zestril)  40 mg PO QPM [facility]


   Last Admin: 01/20/19 17:14 Dose:  40 mg


Metoprolol Tartrate (Lopressor)  50 mg PO BID [facility]


   Last Admin: 01/21/19 12:47 Dose:  Not Given


Mupirocin (Bactroban Ointment)  0 gm TOP DAILY [facility]


   Last Admin: 01/21/19 10:02 Dose:  Not Given


Sevelamer Carbonate (Renvela)  800 mg PO TIDCC [facility]


   Last Admin: 01/21/19 12:55 Dose:  800 mg


Tamsulosin HCl (Flomax)  0.4 mg PO DAILY [facility]


   Last Admin: 01/21/19 10:03 Dose:  Not Given


Vitamin B Complex/Vit C/Folic Acid (Nephro-Roxy)  1 tab PO 0800 [facility]


   Last Admin: 01/21/19 12:47 Dose:  Not Given











- Labs


Labs: 


                                        





                                 01/21/19 07:27 





                                 01/21/19 07:27 





                                        











PT  11.3 SECONDS (9.7-12.2)   01/21/19  07:27    


 


INR  1.0   01/21/19  07:27    


 


APTT  21 SECONDS (21-34)   01/21/19  07:27

## 2019-01-21 NOTE — CP.PCM.PN
Subjective





- Date & Time of Evaluation


Date of Evaluation: 01/21/19


Time of Evaluation: 19:06





Objective





- Vital Signs/Intake and Output


Vital Signs (last 24 hours): 


                                        











Temp Pulse Resp BP Pulse Ox


 


 98.3 F   81   20   173/82 H  98 


 


 01/21/19 15:00  01/21/19 15:00  01/21/19 15:00  01/21/19 15:00  01/21/19 15:00











- Medications


Medications: 


                               Current Medications





Albuterol/Ipratropium (Duoneb 3 Mg/0.5 Mg (3 Ml) Ud)  3 ml INH RQ6 [facility]


   Last Admin: 01/21/19 13:20 Dose:  3 ml


Dextrose (Dextrose 50% Inj)  0 ml IV STAT PRN; Protocol


   PRN Reason: Hypoglycemia Protocol


Dextrose (Glutose 15)  0 gm PO ONCE PRN; Protocol


   PRN Reason: Hypoglycemia Protocol


Epoetin Florentino (Procrit)  8,000 unit IV MWF [facility]


   Last Admin: 01/21/19 11:04 Dose:  8,000 unit


Ergocalciferol (Drisdol 50,000 Intl Units Cap)  1 cap PO Q7D [facility]


   Last Admin: 01/15/19 12:18 Dose:  1 cap


Fluticasone/Vilanterol (Breo Ellipta 200-25 Mcg Inh)  1 puff INH RQD [facility]


   Last Admin: 01/21/19 13:20 Dose:  1 puff


Furosemide (Lasix)  80 mg PO TTS [facility]


Glucagon (Glucagen Diagnostic Kit)  0 mg IM STAT PRN; Protocol


   PRN Reason: Hypoglycemia Protocol


Hydralazine HCl (Apresoline)  50 mg PO BID [facility]


   Last Admin: 01/21/19 17:44 Dose:  50 mg


Insulin Aspart (Novolog)  0 unit SC ACHS [facility]; Protocol


   Last Admin: 01/21/19 17:43 Dose:  2 units


Lisinopril (Zestril)  40 mg PO QPM [facility]


   Last Admin: 01/21/19 17:44 Dose:  40 mg


Metoprolol Tartrate (Lopressor)  50 mg PO BID [facility]


   Last Admin: 01/21/19 17:44 Dose:  50 mg


Mupirocin (Bactroban Ointment)  0 gm TOP DAILY [facility]


   Last Admin: 01/21/19 10:02 Dose:  Not Given


Sevelamer Carbonate (Renvela)  800 mg PO TIDCC [facility]


   Last Admin: 01/21/19 17:44 Dose:  800 mg


Tamsulosin HCl (Flomax)  0.4 mg PO DAILY [facility]


   Last Admin: 01/21/19 10:03 Dose:  Not Given


Vitamin B Complex/Vit C/Folic Acid (Nephro-Roxy)  1 tab PO 0800 [facility]


   Last Admin: 01/21/19 12:47 Dose:  Not Given











- Labs


Labs: 


                                        





                                 01/21/19 07:27 





                                 01/21/19 07:27 





                                        











PT  11.3 SECONDS (9.7-12.2)   01/21/19  07:27    


 


INR  1.0   01/21/19  07:27    


 


APTT  21 SECONDS (21-34)   01/21/19  07:27    














Assessment and Plan


(1) ESRD (end stage renal disease)


Status: Acute   





(2) Heart failure


Status: Acute   





(3) Diabetes


Status: Chronic   





(4) Hypertension


Status: Chronic   





(5) Hypertensive emergency


Status: Acute

## 2019-01-22 LAB
APTT BLD: 28 SECONDS (ref 21–34)
BUN SERPL-MCNC: 38 MG/DL (ref 9–20)
CALCIUM SERPL-MCNC: 7.9 MG/DL (ref 8.6–10.4)
ERYTHROCYTE [DISTWIDTH] IN BLOOD BY AUTOMATED COUNT: 13.7 % (ref 11.5–14.5)
GFR NON-AFRICAN AMERICAN: 11
HGB BLD-MCNC: 10.2 G/DL (ref 12–18)
INR PPP: 1
MCH RBC QN AUTO: 30.4 PG (ref 27–31)
MCHC RBC AUTO-ENTMCNC: 32.7 G/DL (ref 33–37)
MCV RBC AUTO: 92.8 FL (ref 80–94)
PLATELET # BLD: 279 K/UL (ref 130–400)
PMV BLD AUTO: 8.3 FL (ref 7.2–11.7)
PROTHROMBIN TIME: 11.3 SECONDS (ref 9.7–12.2)
RBC # BLD AUTO: 3.37 MIL/UL (ref 4.4–5.9)
WBC # BLD AUTO: 8.6 K/UL (ref 4.8–10.8)

## 2019-01-22 PROCEDURE — 031C0ZF BYPASS LEFT RADIAL ARTERY TO LOWER ARM VEIN, OPEN APPROACH: ICD-10-PCS | Performed by: SURGERY

## 2019-01-22 RX ADMIN — IPRATROPIUM BROMIDE AND ALBUTEROL SULFATE SCH: .5; 3 SOLUTION RESPIRATORY (INHALATION) at 01:15

## 2019-01-22 RX ADMIN — IPRATROPIUM BROMIDE AND ALBUTEROL SULFATE SCH: .5; 3 SOLUTION RESPIRATORY (INHALATION) at 13:54

## 2019-01-22 RX ADMIN — FLUTICASONE FUROATE AND VILANTEROL TRIFENATATE SCH PUFF: 200; 25 POWDER RESPIRATORY (INHALATION) at 07:50

## 2019-01-22 RX ADMIN — IPRATROPIUM BROMIDE AND ALBUTEROL SULFATE SCH ML: .5; 3 SOLUTION RESPIRATORY (INHALATION) at 07:50

## 2019-01-22 RX ADMIN — INSULIN ASPART SCH: 100 INJECTION, SOLUTION INTRAVENOUS; SUBCUTANEOUS at 11:08

## 2019-01-22 RX ADMIN — Medication SCH: at 11:07

## 2019-01-22 RX ADMIN — INSULIN ASPART SCH: 100 INJECTION, SOLUTION INTRAVENOUS; SUBCUTANEOUS at 19:53

## 2019-01-22 RX ADMIN — IPRATROPIUM BROMIDE AND ALBUTEROL SULFATE SCH ML: .5; 3 SOLUTION RESPIRATORY (INHALATION) at 20:30

## 2019-01-22 RX ADMIN — INSULIN ASPART SCH: 100 INJECTION, SOLUTION INTRAVENOUS; SUBCUTANEOUS at 21:38

## 2019-01-22 RX ADMIN — INSULIN ASPART SCH: 100 INJECTION, SOLUTION INTRAVENOUS; SUBCUTANEOUS at 08:37

## 2019-01-22 RX ADMIN — ERGOCALCIFEROL SCH: 1.25 CAPSULE ORAL at 11:07

## 2019-01-22 NOTE — CP.PCM.PN
Subjective





- Date & Time of Evaluation


Date of Evaluation: 01/22/19


Time of Evaluation: 15:36





Objective





- Vital Signs/Intake and Output


Vital Signs (last 24 hours): 


                                        











Temp Pulse Resp BP Pulse Ox


 


 97.8 F   85   21   135/69   100 


 


 01/22/19 15:08  01/22/19 15:08  01/22/19 15:08  01/22/19 15:08  01/22/19 15:08








Intake and Output: 


                                        











 01/22/19 01/22/19





 06:59 18:59


 


Intake Total 320 450


 


Balance 320 450














- Medications


Medications: 


                               Current Medications





Albuterol/Ipratropium (Duoneb 3 Mg/0.5 Mg (3 Ml) Ud)  3 ml INH RQ6 [facility]


   Last Admin: 01/22/19 13:54 Dose:  Not Given


Dextrose (Dextrose 50% Inj)  0 ml IV STAT PRN; Protocol


   PRN Reason: Hypoglycemia Protocol


Dextrose (Glutose 15)  0 gm PO ONCE PRN; Protocol


   PRN Reason: Hypoglycemia Protocol


Epoetin Florentino (Procrit)  8,000 unit IV MWF [facility]


   Last Admin: 01/21/19 11:04 Dose:  8,000 unit


Ergocalciferol (Drisdol 50,000 Intl Units Cap)  1 cap PO Q7D [facility]


   Last Admin: 01/22/19 11:07 Dose:  Not Given


Fluticasone/Vilanterol (Breo Ellipta 200-25 Mcg Inh)  1 puff INH RQD [facility]


   Last Admin: 01/22/19 07:50 Dose:  1 puff


Furosemide (Lasix)  80 mg PO TTS [facility]


   Last Admin: 01/22/19 11:17 Dose:  Not Given


Glucagon (Glucagen Diagnostic Kit)  0 mg IM STAT PRN; Protocol


   PRN Reason: Hypoglycemia Protocol


Hydralazine HCl (Apresoline)  50 mg PO BID [facility]


   Last Admin: 01/22/19 11:07 Dose:  Not Given


Hydromorphone HCl (Dilaudid)  0.5 mg IVP Q5M PRN


   PRN Reason: Pain, severe (8-10)


   Stop: 01/22/19 17:13


Insulin Aspart (Novolog)  0 unit SC ACHS [facility]; Protocol


   Last Admin: 01/22/19 11:08 Dose:  Not Given


Lisinopril (Zestril)  40 mg PO QPM [facility]


   Last Admin: 01/21/19 17:44 Dose:  40 mg


Metoprolol Tartrate (Lopressor)  100 mg PO BID [facility]


Mupirocin (Bactroban Ointment)  0 gm TOP DAILY [facility]


   Last Admin: 01/21/19 10:02 Dose:  Not Given


Sevelamer Carbonate (Renvela)  800 mg PO TIDCC [facility]


   Last Admin: 01/22/19 11:08 Dose:  Not Given


Tamsulosin HCl (Flomax)  0.4 mg PO DAILY [facility]


   Last Admin: 01/22/19 11:07 Dose:  Not Given


Vitamin B Complex/Vit C/Folic Acid (Nephro-Roxy)  1 tab PO 0800 [facility]


   Last Admin: 01/22/19 11:07 Dose:  Not Given











- Labs


Labs: 


                                        





                                 01/22/19 08:32 





                                 01/22/19 08:32 





                                        











PT  11.3 SECONDS (9.7-12.2)   01/22/19  08:32    


 


INR  1.0   01/22/19  08:32    


 


APTT  28 SECONDS (21-34)  D 01/22/19  08:32    














Assessment and Plan


(1) ESRD (end stage renal disease)


Status: Acute   





(2) Heart failure


Status: Acute   





(3) Diabetes


Status: Chronic   





(4) Hypertension


Status: Chronic   





(5) Hypertensive emergency


Status: Acute

## 2019-01-22 NOTE — CP.PCM.PN
Subjective





- Date & Time of Evaluation


Date of Evaluation: 01/22/19


Time of Evaluation: 09:35





- Subjective


Subjective: 


Podiatry Progress Note for Dr. Lee: 





58 year old male seen and evaluated this morning with Dr. Lee for bilateral 

foot digital abrasions. Patient denies any pain to the digits at this time. 

States he is still awaiting discharge to rehab facility. Denies any new pedal 

complaints. He denies N/V/F/SOB/CP.














Objective





- Vital Signs/Intake and Output


Vital Signs (last 24 hours): 


                                        











Temp Pulse Resp BP Pulse Ox


 


 98.5 F   74   18   165/81 H  98 


 


 01/22/19 07:30  01/22/19 07:30  01/22/19 07:30  01/22/19 07:30  01/22/19 07:30








Intake and Output: 


                                        











 01/22/19 01/22/19





 06:59 18:59


 


Intake Total 320 


 


Balance 320 














- Medications


Medications: 


                               Current Medications





Albuterol/Ipratropium (Duoneb 3 Mg/0.5 Mg (3 Ml) Ud)  3 ml INH RQ6 [facility]


   Last Admin: 01/22/19 07:50 Dose:  3 ml


Dextrose (Dextrose 50% Inj)  0 ml IV STAT PRN; Protocol


   PRN Reason: Hypoglycemia Protocol


Dextrose (Glutose 15)  0 gm PO ONCE PRN; Protocol


   PRN Reason: Hypoglycemia Protocol


Epoetin Florentino (Procrit)  8,000 unit IV MWF [facility]


   Last Admin: 01/21/19 11:04 Dose:  8,000 unit


Ergocalciferol (Drisdol 50,000 Intl Units Cap)  1 cap PO Q7D [facility]


   Last Admin: 01/15/19 12:18 Dose:  1 cap


Fluticasone/Vilanterol (Breo Ellipta 200-25 Mcg Inh)  1 puff INH RQD [facility]


   Last Admin: 01/22/19 07:50 Dose:  1 puff


Furosemide (Lasix)  80 mg PO TTS [facility]


Glucagon (Glucagen Diagnostic Kit)  0 mg IM STAT PRN; Protocol


   PRN Reason: Hypoglycemia Protocol


Hydralazine HCl (Apresoline)  50 mg PO BID [facility]


   Last Admin: 01/21/19 17:44 Dose:  50 mg


Insulin Aspart (Novolog)  0 unit SC ACHS [facility]; Protocol


   Last Admin: 01/22/19 08:37 Dose:  Not Given


Lisinopril (Zestril)  40 mg PO QPM [facility]


   Last Admin: 01/21/19 17:44 Dose:  40 mg


Metoprolol Tartrate (Lopressor)  50 mg PO BID [facility]


   Last Admin: 01/21/19 17:44 Dose:  50 mg


Mupirocin (Bactroban Ointment)  0 gm TOP DAILY [facility]


   Last Admin: 01/21/19 10:02 Dose:  Not Given


Sevelamer Carbonate (Renvela)  800 mg PO TIDCC [facility]


   Last Admin: 01/21/19 17:44 Dose:  800 mg


Tamsulosin HCl (Flomax)  0.4 mg PO DAILY [facility]


   Last Admin: 01/21/19 10:03 Dose:  Not Given


Vitamin B Complex/Vit C/Folic Acid (Nephro-Roxy)  1 tab PO 0800 [facility]


   Last Admin: 01/21/19 12:47 Dose:  Not Given











- Labs


Labs: 


                                        





                                 01/22/19 08:32 





                                 01/22/19 08:32 





                                        











PT  11.3 SECONDS (9.7-12.2)   01/22/19  08:32    


 


INR  1.0   01/22/19  08:32    


 


APTT  28 SECONDS (21-34)  D 01/22/19  08:32    














- Constitutional


Appears: Well, Non-toxic, No Acute Distress





- Extremities Exam


Additional comments: 


B/L Lower extremity exam: 


Vasc: DP/PT palpable, CFT < 3 seconds, Temperature gradient warm to warm. + 2 

pitting edema appreciated to B/L legs beginning at tibial tuberosity and 

extending to distal forefoot. 


Ortho: No pain with palpation upon calf compression, MMT 5/5 in all 

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 


Neuro:  Gross sensation intact, protective sensation diminished


Derm: Superficial abrasion noted to the dorsal aspect of the L 4th digit, no 

purulence, mild erythema noted grace-wound, no drainage at this time, no clinical

signs of infection. Additional superficial abrasions appreciated to the R dorsal

aspect of hallux, 2nd digit, and 3rd digit, no purulence, mild erythema 

appreciated periwound, no purulence, no clinical signs of infection.

















- Neurological Exam


Neurological Exam: Alert, Awake, Oriented x3





- Psychiatric Exam


Psychiatric exam: Normal Affect, Normal Mood





Assessment and Plan





- Assessment and Plan (Free Text)


Assessment: 





58 year old male patient, with for b/l foot abrasions; stable and not infected 

at this time 





Plan: 


Patient seen and evaluated with Dr. Lee 


Local wound care: Mupirocin, DSD applied to abrasion sites on both feet


Will continue to follow patient


Patient stable from podiatry standpoint, to follow up with Dr. Lee as 

outpatient

## 2019-01-22 NOTE — CP.PCM.PN
Subjective





- Date & Time of Evaluation


Date of Evaluation: 01/22/19


Time of Evaluation: 11:22





- Subjective


Subjective: 





Nephrology Consultation Note:





Assessment: stable


Acute Kidney Injury (N17.9) ? etiology. eval for GN such as ANCA. likely ESRD 


mild hyerkalemia and metabolic acidosis


fluid overload, possible CHF exacerbation EF 45%


Diabetic chronic Kidney Disease (E11.22) 


Hypertensive Chronic Kidney Disease (I12.9)


Chronic Kidney Disease (N18.3) Stage 3 with 8 gram proteinuria (R80.9) likely 

due to HTN/obesity


morbid obesity


hx of cavitary lung lesions in 2016, resolved on follow up Chest Ct





Plan


HD started 1/15/19. plan for HD as MWF. may do HD today after OR if possible to 

change to TTS schedule


Hypertension control with meds as ordered. Maintain hemodynamics stable. pt 

started on lisinopril


cardiology pulmonary following


started flomax, vit d and phos binders, nephrovite


no plan for biopsy considering renal cortical thinning on usg and morbid 

obesity, CHF and serology for GN + such as anca. 





Dose meds/antibiotics for reduced GFR. Avoid fleets enema/magnesium based 

laxatives. Avoid nephrotoxins/NSAIDs/ iodinated contrast (unless needed 

emergently)


Glycemic control


Further work up/management as per primary team


SW consult for outpt HD placement (accepted at [facility] TTS 3rd shift)


pt might be going to [facility] requested vascular surgery for AV access surgery before

d/c, plan for tomorrow





Thanks for allowing me to participate in care of your patient. Will follow 

patient with you. Please call if any Qs. had d/w team


Dr Anton Mcdaniel


Office: [phone] Cell: [phone] Fax: [phone]





Chief Complaint; SOB


Reason for consult: Acute Kidney Injury


HPI: Pt is a 58 M with hx of diabetes Mellitus ( 5years), hypertension (recent 

years) morbid obesity no regular medical follow up presented with complaints of 

worsening SOB on minimal exertion and on lying down. reported leg swelling. 

symptoms only for last few days as per pt. his serum cr 1.5-1.6 in 2016 also 

with cavitary lesion in lung that time. now cr 5 range


Denies OTC/herbal meds or NSAIDs


No recent iodinated contrast exposure. No obvious episodes of low BP. bP high in

200s when came


denies smoking/etoh or drugs





ROS: 


Cardiovascular: No chest pain. 


Pulmonary: shortness of breath better


Gastrointestinal: denies abdominal pain No nausea. No vomiting. 


Genitourinary: No pain while urinating. Denies blood in urine. 


All other negative except as mentioned in HPI





Physical Examination:      


General Appearance: Comfortable, in no acute respiratory distress, co-operative 

. morbid obese


Vitals reviewed and noted as below


Head; Atraumatic, normocephalic


ENT: no ulcers no thrush. Tongue is midline. Oropharynx: no rash or ulcers.


EYES: Pupils are equal, round and reactive to light accommodation. Eye muscles 

and extraocular movement intact. Sclera is anicteric.


Neck; supple no lymphadenopathy, no thyromegaly or bruit


Lungs: normal respiratory rate/effort. Breath sounds bilateral improved


Heart: Normal rate. s1s2 normal. No rub or gallop. 


Extremities: 3+ edema. No varicose veins


Neurological: Patient is alert, awake and oriented to person, place and time. No

focal deficit. Strength bilateral appropriate and equal


Skin: Warm and dry. Normal turgor. No rash. Palpitation: Normal elasticity for 

age


Abdomen: Abdomen is soft. Bowel sounds +. There is no abdominal tenderness, no 

guarding/rigidity no organomegaly


Psych: normal insight and normal affect/mood


MSK: no joint tenderness or swelling. Digits and nails normal, no deformity. 


: kidney or bladder not palpable


access: pc





Labs/imaging reviewed.


Past medical history, past surgical history, family history, social history, 

allergy reviewed and noted as below


Family hx: no hx of CKD. Rest non-contributory 








Objective





- Vital Signs/Intake and Output


Vital Signs (last 24 hours): 


                                        











Temp Pulse Resp BP Pulse Ox


 


 98.5 F   74   18   165/81 H  98 


 


 01/22/19 07:30  01/22/19 07:30  01/22/19 07:30  01/22/19 07:30  01/22/19 07:30








Intake and Output: 


                                        











 01/22/19 01/22/19





 06:59 18:59


 


Intake Total 320 


 


Balance 320 














- Medications


Medications: 


                               Current Medications





Albuterol/Ipratropium (Duoneb 3 Mg/0.5 Mg (3 Ml) Ud)  3 ml INH RQ6 [facility]


   Last Admin: 01/22/19 07:50 Dose:  3 ml


Dextrose (Dextrose 50% Inj)  0 ml IV STAT PRN; Protocol


   PRN Reason: Hypoglycemia Protocol


Dextrose (Glutose 15)  0 gm PO ONCE PRN; Protocol


   PRN Reason: Hypoglycemia Protocol


Epoetin Florentino (Procrit)  8,000 unit IV MWF [facility]


   Last Admin: 01/21/19 11:04 Dose:  8,000 unit


Ergocalciferol (Drisdol 50,000 Intl Units Cap)  1 cap PO Q7D [facility]


   Last Admin: 01/22/19 11:07 Dose:  Not Given


Fluticasone/Vilanterol (Breo Ellipta 200-25 Mcg Inh)  1 puff INH RQD [facility]


   Last Admin: 01/22/19 07:50 Dose:  1 puff


Furosemide (Lasix)  80 mg PO TTS [facility]


   Last Admin: 01/22/19 11:17 Dose:  Not Given


Glucagon (Glucagen Diagnostic Kit)  0 mg IM STAT PRN; Protocol


   PRN Reason: Hypoglycemia Protocol


Hydralazine HCl (Apresoline)  50 mg PO BID [facility]


   Last Admin: 01/22/19 11:07 Dose:  Not Given


Insulin Aspart (Novolog)  0 unit SC ACHS [facility]; Protocol


   Last Admin: 01/22/19 11:08 Dose:  Not Given


Lisinopril (Zestril)  40 mg PO QPM [facility]


   Last Admin: 01/21/19 17:44 Dose:  40 mg


Metoprolol Tartrate (Lopressor)  100 mg PO BID [facility]


Mupirocin (Bactroban Ointment)  0 gm TOP DAILY [facility]


   Last Admin: 01/21/19 10:02 Dose:  Not Given


Sevelamer Carbonate (Renvela)  800 mg PO TIDCC [facility]


   Last Admin: 01/22/19 11:08 Dose:  Not Given


Tamsulosin HCl (Flomax)  0.4 mg PO DAILY [facility]


   Last Admin: 01/22/19 11:07 Dose:  Not Given


Vitamin B Complex/Vit C/Folic Acid (Nephro-Roxy)  1 tab PO 0800 [facility]


   Last Admin: 01/22/19 11:07 Dose:  Not Given











- Labs


Labs: 


                                        





                                 01/22/19 08:32 





                                 01/22/19 08:32 





                                        











PT  11.3 SECONDS (9.7-12.2)   01/22/19  08:32    


 


INR  1.0   01/22/19  08:32    


 


APTT  28 SECONDS (21-34)  D 01/22/19  08:32

## 2019-01-22 NOTE — OP
PROCEDURE DATE:  01/22/2019



PREOPERATIVE DIAGNOSIS:  Renal failure.



POSTOPERATIVE DIAGNOSIS:  Renal failure.



PROCEDURE CARRIED OUT:  Snuffbox fistula, left wrist.



SURGEON:  Shubham Menchaca Jr., MD



ASSISTANT:  Anmol Ha DO



ANESTHESIOLOGIST:   Hull



INDICATIONS:  The patient is a 58-year-old male with renal insufficiency,

presently dialyzed with use of a catheter.



OPERATIVE FINDINGS:  The best vein was in the forearm.  We carried out a

snuffbox fistula between the cephalic vein and the branch of the radial

artery at the wrist in the anatomic snuffbox.



DESCRIPTION OF PROCEDURE:  The patient was given general anesthesia and

intravenous antibiotics.  Using the ultrasound, the veins were marked on

the skin.  Veins in the antecubital fossa did not leave through good

cephalic vein going up the arm and this had led to a two-staged procedure. 

So, this was one of the other reasons why we carried out the snuffbox

fistula.  At the completion of the operation, there was a palpable pulse

distal to it and there was good flow to the fistula.  Blood loss for the

procedure was less than 30 mL.  The anastomosis was carried out using loupe

magnification, heparin anticoagulation and vessel loop control.  The

vessels were of adequate size, just under 2 mm.  The vein was of adequate

size also measuring over 2 mm.  After completion of the procedure, there

was good flow.  The procedure was terminated.  The skin was closed with 5-0

nylon sutures.  Operation carried out was snuffbox fistula, left wrist.







__________________________________________

Shubham Menchaca Jr., MD





CC:  Anton Mcdaniel MD



DD:  01/22/2019 15:04:28

DT:  01/22/2019 15:50:25

Job # [id number]

## 2019-01-22 NOTE — CP.PCM.PN
Subjective





- Date & Time of Evaluation


Date of Evaluation: 01/22/19


Time of Evaluation: 08:45





- Subjective


Subjective: 


clinically same





Objective





- Vital Signs/Intake and Output


Vital Signs (last 24 hours): 


                                        











Temp Pulse Resp BP Pulse Ox


 


 98.4 F   70   10 L  126/78   100 


 


 01/22/19 16:10  01/22/19 16:10  01/22/19 16:10  01/22/19 16:10  01/22/19 16:10








Intake and Output: 


                                        











 01/22/19 01/22/19





 06:59 18:59


 


Intake Total 320 500


 


Balance 320 500














- Medications


Medications: 


                               Current Medications





Albuterol/Ipratropium (Duoneb 3 Mg/0.5 Mg (3 Ml) Ud)  3 ml INH RQ6 [facility]


   Last Admin: 01/22/19 13:54 Dose:  Not Given


Dextrose (Dextrose 50% Inj)  0 ml IV STAT PRN; Protocol


   PRN Reason: Hypoglycemia Protocol


Dextrose (Glutose 15)  0 gm PO ONCE PRN; Protocol


   PRN Reason: Hypoglycemia Protocol


Epoetin Florentino (Procrit)  8,000 unit IV MWF [facility]


   Last Admin: 01/21/19 11:04 Dose:  8,000 unit


Ergocalciferol (Drisdol 50,000 Intl Units Cap)  1 cap PO Q7D [facility]


   Last Admin: 01/22/19 11:07 Dose:  Not Given


Fluticasone/Vilanterol (Breo Ellipta 200-25 Mcg Inh)  1 puff INH RQD [facility]


   Last Admin: 01/22/19 07:50 Dose:  1 puff


Furosemide (Lasix)  80 mg PO TTS [facility]


   Last Admin: 01/22/19 11:17 Dose:  Not Given


Glucagon (Glucagen Diagnostic Kit)  0 mg IM STAT PRN; Protocol


   PRN Reason: Hypoglycemia Protocol


Hydralazine HCl (Apresoline)  50 mg PO BID [facility]


   Last Admin: 01/22/19 17:19 Dose:  50 mg


Insulin Aspart (Novolog)  0 unit SC ACHS [facility]; Protocol


   Last Admin: 01/22/19 11:08 Dose:  Not Given


Lisinopril (Zestril)  40 mg PO QPM [facility]


   Last Admin: 01/22/19 17:19 Dose:  40 mg


Metoprolol Tartrate (Lopressor)  100 mg PO BID [facility]


   Last Admin: 01/22/19 17:18 Dose:  100 mg


Mupirocin (Bactroban Ointment)  0 gm TOP DAILY [facility]


   Last Admin: 01/21/19 10:02 Dose:  Not Given


Sevelamer Carbonate (Renvela)  800 mg PO TIDCC [facility]


   Last Admin: 01/22/19 17:19 Dose:  800 mg


Tamsulosin HCl (Flomax)  0.4 mg PO DAILY [facility]


   Last Admin: 01/22/19 11:07 Dose:  Not Given


Vitamin B Complex/Vit C/Folic Acid (Nephro-Roxy)  1 tab PO 0800 [facility]


   Last Admin: 01/22/19 11:07 Dose:  Not Given











- Labs


Labs: 


                                        





                                 01/22/19 08:32 





                                 01/22/19 08:32 





                                        











PT  11.3 SECONDS (9.7-12.2)   01/22/19  08:32    


 


INR  1.0   01/22/19  08:32    


 


APTT  28 SECONDS (21-34)  D 01/22/19  08:32    














- Constitutional


Appears: Well





- Head Exam


Head Exam: ATRAUMATIC, NORMAL INSPECTION, NORMOCEPHALIC





- Eye Exam


Eye Exam: EOMI, Normal appearance, PERRL


Pupil Exam: NORMAL ACCOMODATION, PERRL





- ENT Exam


ENT Exam: Mucous Membranes Moist, Normal Exam





- Neck Exam


Neck Exam: Full ROM, Normal Inspection.  absent: Lymphadenopathy





- Respiratory Exam


Respiratory Exam: Decreased Breath Sounds





- Cardiovascular Exam


Cardiovascular Exam: REGULAR RHYTHM, +S1, +S2





- GI/Abdominal Exam


GI & Abdominal Exam: Soft, Diminished Bowel Sounds





- Rectal Exam


Rectal Exam: Deferred

## 2019-01-23 RX ADMIN — Medication SCH TAB: at 08:19

## 2019-01-23 RX ADMIN — IPRATROPIUM BROMIDE AND ALBUTEROL SULFATE SCH ML: .5; 3 SOLUTION RESPIRATORY (INHALATION) at 07:44

## 2019-01-23 RX ADMIN — INSULIN ASPART SCH UNITS: 100 INJECTION, SOLUTION INTRAVENOUS; SUBCUTANEOUS at 17:15

## 2019-01-23 RX ADMIN — IPRATROPIUM BROMIDE AND ALBUTEROL SULFATE SCH ML: .5; 3 SOLUTION RESPIRATORY (INHALATION) at 01:10

## 2019-01-23 RX ADMIN — ERYTHROPOIETIN SCH UNIT: 4000 INJECTION, SOLUTION INTRAVENOUS; SUBCUTANEOUS at 10:47

## 2019-01-23 RX ADMIN — IPRATROPIUM BROMIDE AND ALBUTEROL SULFATE SCH ML: .5; 3 SOLUTION RESPIRATORY (INHALATION) at 20:30

## 2019-01-23 RX ADMIN — IPRATROPIUM BROMIDE AND ALBUTEROL SULFATE SCH ML: .5; 3 SOLUTION RESPIRATORY (INHALATION) at 13:24

## 2019-01-23 RX ADMIN — FLUTICASONE FUROATE AND VILANTEROL TRIFENATATE SCH PUFF: 200; 25 POWDER RESPIRATORY (INHALATION) at 07:44

## 2019-01-23 RX ADMIN — INSULIN ASPART SCH: 100 INJECTION, SOLUTION INTRAVENOUS; SUBCUTANEOUS at 22:25

## 2019-01-23 RX ADMIN — INSULIN ASPART SCH: 100 INJECTION, SOLUTION INTRAVENOUS; SUBCUTANEOUS at 11:24

## 2019-01-23 RX ADMIN — INSULIN ASPART SCH: 100 INJECTION, SOLUTION INTRAVENOUS; SUBCUTANEOUS at 08:11

## 2019-01-23 NOTE — CP.PCM.PN
Objective





- Vital Signs/Intake and Output


Vital Signs (last 24 hours): 


                                        











Temp Pulse Resp BP Pulse Ox


 


 98 F   71   18   129/72   98 


 


 01/23/19 12:45  01/23/19 12:45  01/23/19 12:45  01/23/19 12:45  01/23/19 12:45








Intake and Output: 


                                        











 01/23/19 01/23/19





 06:59 18:59


 


Intake Total  500


 


Balance  500














- Medications


Medications: 


                               Current Medications





Albuterol/Ipratropium (Duoneb 3 Mg/0.5 Mg (3 Ml) Ud)  3 ml INH RQ6 [facility]


   Last Admin: 01/23/19 13:24 Dose:  3 ml


Dextrose (Dextrose 50% Inj)  0 ml IV STAT PRN; Protocol


   PRN Reason: Hypoglycemia Protocol


Dextrose (Glutose 15)  0 gm PO ONCE PRN; Protocol


   PRN Reason: Hypoglycemia Protocol


Epoetin Florentino (Procrit)  8,000 unit IV MWF [facility]


   Last Admin: 01/23/19 10:47 Dose:  8,000 unit


Ergocalciferol (Drisdol 50,000 Intl Units Cap)  1 cap PO Q7D [facility]


   Last Admin: 01/22/19 11:07 Dose:  Not Given


Fluticasone/Vilanterol (Breo Ellipta 200-25 Mcg Inh)  1 puff INH RQD [facility]


   Last Admin: 01/23/19 07:44 Dose:  1 puff


Furosemide (Lasix)  80 mg PO TTS [facility]


   Last Admin: 01/22/19 11:17 Dose:  Not Given


Glucagon (Glucagen Diagnostic Kit)  0 mg IM STAT PRN; Protocol


   PRN Reason: Hypoglycemia Protocol


Hydralazine HCl (Apresoline)  50 mg PO BID [facility]


   Last Admin: 01/23/19 13:43 Dose:  50 mg


Insulin Aspart (Novolog)  0 unit SC ACHS [facility]; Protocol


   Last Admin: 01/23/19 11:24 Dose:  Not Given


Lisinopril (Zestril)  40 mg PO QPM [facility]


   Last Admin: 01/22/19 17:19 Dose:  40 mg


Metoprolol Tartrate (Lopressor)  100 mg PO BID [facility]


   Last Admin: 01/23/19 09:31 Dose:  Not Given


Mupirocin (Bactroban Ointment)  0 gm TOP DAILY [facility]


   Last Admin: 01/23/19 09:31 Dose:  Not Given


Sevelamer Carbonate (Renvela)  800 mg PO TIDCC [facility]


   Last Admin: 01/23/19 13:43 Dose:  800 mg


Tamsulosin HCl (Flomax)  0.4 mg PO DAILY [facility]


   Last Admin: 01/23/19 13:42 Dose:  0.4 mg


Vitamin B Complex/Vit C/Folic Acid (Nephro-Roxy)  1 tab PO 0800 [facility]


   Last Admin: 01/23/19 08:19 Dose:  1 tab











- Labs


Labs: 


                                        





                                 01/22/19 08:32 





                                 01/22/19 08:32 





                                        











PT  11.3 SECONDS (9.7-12.2)   01/22/19  08:32    


 


INR  1.0   01/22/19  08:32    


 


APTT  28 SECONDS (21-34)  D 01/22/19  08:32    














Assessment and Plan


(1) ESRD (end stage renal disease)


Status: Acute   





(2) Heart failure


Status: Acute   





(3) Diabetes


Status: Chronic   





(4) Hypertension


Status: Chronic   





(5) Hypertensive emergency


Status: Acute

## 2019-01-23 NOTE — CP.PCM.PN
Subjective





- Date & Time of Evaluation


Date of Evaluation: 01/23/19


Time of Evaluation: 06:00





- Subjective


Subjective: 





Surgery progress note for Dr. Menchaca.





Patient seen and examined at bedside. Patient is POD # 1 for L AVF. Patient 

lying in bed in no acute distress. Patient states he currently has no pain and 

feels well. Patient denies chest pain, SOB, nausea, vomiting. patient admits to 

passing gas and having bowel movements. 





Objective





- Vital Signs/Intake and Output


Vital Signs (last 24 hours): 


                                        











Temp Pulse Resp BP Pulse Ox


 


 98 F   68   20   157/75 H  98 


 


 01/23/19 07:19  01/23/19 07:19  01/23/19 07:19  01/23/19 07:19  01/23/19 07:19











- Medications


Medications: 


                               Current Medications





Albuterol/Ipratropium (Duoneb 3 Mg/0.5 Mg (3 Ml) Ud)  3 ml INH RQ6 [facility]


   Last Admin: 01/23/19 01:10 Dose:  3 ml


Dextrose (Dextrose 50% Inj)  0 ml IV STAT PRN; Protocol


   PRN Reason: Hypoglycemia Protocol


Dextrose (Glutose 15)  0 gm PO ONCE PRN; Protocol


   PRN Reason: Hypoglycemia Protocol


Epoetin Florentino (Procrit)  8,000 unit IV MWF [facility]


   Last Admin: 01/21/19 11:04 Dose:  8,000 unit


Ergocalciferol (Drisdol 50,000 Intl Units Cap)  1 cap PO Q7D [facility]


   Last Admin: 01/22/19 11:07 Dose:  Not Given


Fluticasone/Vilanterol (Breo Ellipta 200-25 Mcg Inh)  1 puff INH RQD [facility]


   Last Admin: 01/22/19 07:50 Dose:  1 puff


Furosemide (Lasix)  80 mg PO TTS [facility]


   Last Admin: 01/22/19 11:17 Dose:  Not Given


Glucagon (Glucagen Diagnostic Kit)  0 mg IM STAT PRN; Protocol


   PRN Reason: Hypoglycemia Protocol


Hydralazine HCl (Apresoline)  50 mg PO BID [facility]


   Last Admin: 01/22/19 17:19 Dose:  50 mg


Insulin Aspart (Novolog)  0 unit SC ACHS [facility]; Protocol


   Last Admin: 01/22/19 21:38 Dose:  Not Given


Lisinopril (Zestril)  40 mg PO QPM [facility]


   Last Admin: 01/22/19 17:19 Dose:  40 mg


Metoprolol Tartrate (Lopressor)  100 mg PO BID [facility]


   Last Admin: 01/22/19 17:18 Dose:  100 mg


Mupirocin (Bactroban Ointment)  0 gm TOP DAILY [facility]


   Last Admin: 01/21/19 10:02 Dose:  Not Given


Sevelamer Carbonate (Renvela)  800 mg PO TIDCC [facility]


   Last Admin: 01/22/19 17:19 Dose:  800 mg


Tamsulosin HCl (Flomax)  0.4 mg PO DAILY [facility]


   Last Admin: 01/22/19 11:07 Dose:  Not Given


Vitamin B Complex/Vit C/Folic Acid (Nephro-Roxy)  1 tab PO 0800 [facility]


   Last Admin: 01/22/19 11:07 Dose:  Not Given











- Labs


Labs: 


                                        





                                 01/22/19 08:32 





                                 01/22/19 08:32 





                                        











PT  11.3 SECONDS (9.7-12.2)   01/22/19  08:32    


 


INR  1.0   01/22/19  08:32    


 


APTT  28 SECONDS (21-34)  D 01/22/19  08:32    














- Constitutional


Appears: Non-toxic, No Acute Distress





- Head Exam


Head Exam: NORMAL INSPECTION





- Neck Exam


Neck Exam: Full ROM


Additional comments: 





R IJ permacath in place, dressing clean, dry, intact 





- Respiratory Exam


Respiratory Exam: Clear to Ausculation Bilateral, NORMAL BREATHING PATTERN





- Cardiovascular Exam


Cardiovascular Exam: +S1, +S2





- GI/Abdominal Exam


GI & Abdominal Exam: Soft, Normal Bowel Sounds





- Extremities Exam


Extremities Exam: Pedal Edema


Additional comments: 





1+ pedal edema


dry, scaly skin on B/ L LE


L wrist AVF dressing clean, dry, intact


bruit present, no thrill observed at this time 





- Back Exam


Back Exam: absent: CVA tenderness (L), CVA tenderness (R)





- Neurological Exam


Neurological Exam: Alert, Awake, Oriented x3





- Psychiatric Exam


Psychiatric exam: Normal Affect, Normal Mood





- Skin


Skin Exam: Dry, Intact, Normal Color, Warm





Assessment and Plan





- Assessment and Plan (Free Text)


Assessment: 





58 M POD # 1 for L AVF, and POD #8 R IJ permacath placement


Plan: 





- patient is cleared for D/C from surgical standpoint, at discretion of primary 

team


- F/u in 1 week outpatient with Dr. Menchaca


   - Please call to make an appointment


- RIJ in place and use for dialysis 


- D/w with Dr. Bernarda Hull, PGY1

## 2019-01-23 NOTE — CP.PCM.PN
Subjective





- Date & Time of Evaluation


Date of Evaluation: 01/23/19


Time of Evaluation: 09:30





- Subjective


Subjective: 


clinically same





Objective





- Vital Signs/Intake and Output


Vital Signs (last 24 hours): 


                                        











Temp Pulse Resp BP Pulse Ox


 


 97.7 F   84   20   174/75 H  94 L


 


 01/23/19 15:09  01/23/19 15:09  01/23/19 15:09  01/23/19 15:09  01/23/19 15:09








Intake and Output: 


                                        











 01/23/19 01/24/19





 18:59 06:59


 


Intake Total 500 


 


Balance 500 














- Medications


Medications: 


                               Current Medications





Albuterol/Ipratropium (Duoneb 3 Mg/0.5 Mg (3 Ml) Ud)  3 ml INH RQ6 [facility] beds at [facility]


   Last Admin: 01/23/19 13:24 Dose:  3 ml


Dextrose (Dextrose 50% Inj)  0 ml IV STAT PRN; Protocol


   PRN Reason: Hypoglycemia Protocol


Dextrose (Glutose 15)  0 gm PO ONCE PRN; Protocol


   PRN Reason: Hypoglycemia Protocol


Epoetin Florentino (Procrit)  8,000 unit IV MWF Counts include 234 beds at the [facility]


   Last Admin: 01/23/19 10:47 Dose:  8,000 unit


Ergocalciferol (Drisdol 50,000 Intl Units Cap)  1 cap PO Q7D Counts include 234 beds at the [facility]


   Last Admin: 01/22/19 11:07 Dose:  Not Given


Fluticasone/Vilanterol (Breo Ellipta 200-25 Mcg Inh)  1 puff INH RQD Counts include 234 beds at the [facility]


   Last Admin: 01/23/19 07:44 Dose:  1 puff


Furosemide (Lasix)  80 mg PO TTS Counts include 234 beds at the [facility]


   Last Admin: 01/22/19 11:17 Dose:  Not Given


Glucagon (Glucagen Diagnostic Kit)  0 mg IM STAT PRN; Protocol


   PRN Reason: Hypoglycemia Protocol


Hydralazine HCl (Apresoline)  50 mg PO BID Counts include 234 beds at the [facility]


   Last Admin: 01/23/19 17:15 Dose:  50 mg


Insulin Aspart (Novolog)  0 unit SC ACHS Counts include 234 beds at the [facility]; Protocol


   Last Admin: 01/23/19 17:15 Dose:  2 units


Lisinopril (Zestril)  40 mg PO QPM Counts include 234 beds at the [facility]


   Last Admin: 01/22/19 17:19 Dose:  40 mg


Metoprolol Tartrate (Lopressor)  100 mg PO BID Counts include 234 beds at the [facility]


   Last Admin: 01/23/19 17:15 Dose:  100 mg


Mupirocin (Bactroban Ointment)  0 gm TOP DAILY Counts include 234 beds at the [facility]


   Last Admin: 01/23/19 09:31 Dose:  Not Given


Sevelamer Carbonate (Renvela)  800 mg PO TIDCC Counts include 234 beds at the [facility]


   Last Admin: 01/23/19 17:15 Dose:  800 mg


Tamsulosin HCl (Flomax)  0.4 mg PO DAILY Counts include 234 beds at the [facility]


   Last Admin: 01/23/19 13:42 Dose:  0.4 mg


Vitamin B Complex/Vit C/Folic Acid (Nephro-Roxy)  1 tab PO 0800 Counts include 234 beds at the [facility]


   Last Admin: 01/23/19 08:19 Dose:  1 tab











- Labs


Labs: 


                                        





                                 01/22/19 08:32 





                                 01/22/19 08:32 





                                        











PT  11.3 SECONDS (9.7-12.2)   01/22/19  08:32    


 


INR  1.0   01/22/19  08:32    


 


APTT  28 SECONDS (21-34)  D 01/22/19  08:32    














- Constitutional


Appears: Well





- Head Exam


Head Exam: ATRAUMATIC, NORMAL INSPECTION, NORMOCEPHALIC





- Eye Exam


Eye Exam: EOMI, Normal appearance, PERRL


Pupil Exam: NORMAL ACCOMODATION, PERRL





- ENT Exam


ENT Exam: Mucous Membranes Moist, Normal Exam





- Neck Exam


Neck Exam: Full ROM, Normal Inspection.  absent: Lymphadenopathy





- Respiratory Exam


Respiratory Exam: Decreased Breath Sounds





- Cardiovascular Exam


Cardiovascular Exam: REGULAR RHYTHM, +S1, +S2





- GI/Abdominal Exam


GI & Abdominal Exam: Soft, Diminished Bowel Sounds





- Rectal Exam


Rectal Exam: Deferred

## 2019-01-23 NOTE — CP.PCM.PN
Subjective





- Date & Time of Evaluation


Date of Evaluation: 01/23/19


Time of Evaluation: 17:00





- Subjective


Subjective: 





Nephrology Consultation Note:





Assessment: stable


Acute Kidney Injury (N17.9) ? etiology. eval for GN such as ANCA. likely ESRD 


mild hyerkalemia and metabolic acidosis


fluid overload, possible CHF exacerbation EF 45%


Diabetic chronic Kidney Disease (E11.22) 


Hypertensive Chronic Kidney Disease (I12.9)


Chronic Kidney Disease (N18.3) Stage 3 with 8 gram proteinuria (R80.9) likely 

due to HTN/obesity


morbid obesity


hx of cavitary lung lesions in 2016, resolved on follow up Chest Ct





Plan


HD started 1/15/19. plan for HD as MWF while in hospital


Hypertension control with meds as ordered. Maintain hemodynamics stable. pt 

started on lisinopril


cardiology pulmonary following


started flomax, vit d and phos binders, nephrovite


no plan for biopsy considering renal cortical thinning on usg and morbid 

obesity, CHF and serology for GN negative





Dose meds/antibiotics for reduced GFR. Avoid fleets enema/magnesium based 

laxatives. Avoid nephrotoxins/NSAIDs/ iodinated contrast (unless needed 

emergently)


Glycemic control


Further work up/management as per primary team


SW consult for outpt HD placement (accepted at [facility] TTS 3rd shift)





pt might be going to [facility] soon. stable for d/c from renal perspective





Thanks for allowing me to participate in care of your patient. Will follow 

patient with you. Please call if any Qs. had d/w team


Dr Anton Mcdaniel


Office: [phone] Cell: [phone] Fax: [phone]





Chief Complaint; SOB


Reason for consult: Acute Kidney Injury


HPI: Pt is a 58 M with hx of diabetes Mellitus ( 5years), hypertension (recent 

years) morbid obesity no regular medical follow up presented with complaints of 

worsening SOB on minimal exertion and on lying down. reported leg swelling. 

symptoms only for last few days as per pt. his serum cr 1.5-1.6 in 2016 also 

with cavitary lesion in lung that time. now cr 5 range


Denies OTC/herbal meds or NSAIDs


No recent iodinated contrast exposure. No obvious episodes of low BP. bP high in

200s when came


denies smoking/etoh or drugs





ROS: 


Cardiovascular: No chest pain. 


Pulmonary: shortness of breath better


Gastrointestinal: denies abdominal pain No nausea. No vomiting. 


Genitourinary: No pain while urinating. Denies blood in urine. 


All other negative except as mentioned in HPI





Physical Examination:      


General Appearance: Comfortable, in no acute respiratory distress, co-operative 

. morbid obese


Vitals reviewed and noted as below


Head; Atraumatic, normocephalic


ENT: no ulcers no thrush. Tongue is midline. Oropharynx: no rash or ulcers.


EYES: Pupils are equal, round and reactive to light accommodation. Eye muscles 

and extraocular movement intact. Sclera is anicteric.


Neck; supple no lymphadenopathy, no thyromegaly or bruit


Lungs: normal respiratory rate/effort. Breath sounds bilateral improved


Heart: Normal rate. s1s2 normal. No rub or gallop. 


Extremities: 3+ edema. No varicose veins


Neurological: Patient is alert, awake and oriented to person, place and time. No

focal deficit. Strength bilateral appropriate and equal


Skin: Warm and dry. Normal turgor. No rash. Palpitation: Normal elasticity for 

age


Abdomen: Abdomen is soft. Bowel sounds +. There is no abdominal tenderness, no 

guarding/rigidity no organomegaly


Psych: normal insight and normal affect/mood


MSK: no joint tenderness or swelling. Digits and nails normal, no deformity. 


: kidney or bladder not palpable


access: pc. left AVF placed has thrill and bruit





Labs/imaging reviewed.


Past medical history, past surgical history, family history, social history, 

allergy reviewed and noted as below


Family hx: no hx of CKD. Rest non-contributory 








Objective





- Vital Signs/Intake and Output


Vital Signs (last 24 hours): 


                                        











Temp Pulse Resp BP Pulse Ox


 


 97.7 F   84   20   174/75 H  94 L


 


 01/23/19 15:09  01/23/19 15:09  01/23/19 15:09  01/23/19 15:09  01/23/19 15:09








Intake and Output: 


                                        











 01/23/19 01/23/19





 06:59 18:59


 


Intake Total  500


 


Balance  500














- Medications


Medications: 


                               Current Medications





Albuterol/Ipratropium (Duoneb 3 Mg/0.5 Mg (3 Ml) Ud)  3 ml INH RQ6 [facility]


   Last Admin: 01/23/19 13:24 Dose:  3 ml


Dextrose (Dextrose 50% Inj)  0 ml IV STAT PRN; Protocol


   PRN Reason: Hypoglycemia Protocol


Dextrose (Glutose 15)  0 gm PO ONCE PRN; Protocol


   PRN Reason: Hypoglycemia Protocol


Epoetin Florentino (Procrit)  8,000 unit IV MWF [facility]


   Last Admin: 01/23/19 10:47 Dose:  8,000 unit


Ergocalciferol (Drisdol 50,000 Intl Units Cap)  1 cap PO Q7D [facility]


   Last Admin: 01/22/19 11:07 Dose:  Not Given


Fluticasone/Vilanterol (Breo Ellipta 200-25 Mcg Inh)  1 puff INH RQD [facility]


   Last Admin: 01/23/19 07:44 Dose:  1 puff


Furosemide (Lasix)  80 mg PO TTS [facility]


   Last Admin: 01/22/19 11:17 Dose:  Not Given


Glucagon (Glucagen Diagnostic Kit)  0 mg IM STAT PRN; Protocol


   PRN Reason: Hypoglycemia Protocol


Hydralazine HCl (Apresoline)  50 mg PO BID [facility]


   Last Admin: 01/23/19 13:43 Dose:  50 mg


Insulin Aspart (Novolog)  0 unit SC ACHS [facility]; Protocol


   Last Admin: 01/23/19 11:24 Dose:  Not Given


Lisinopril (Zestril)  40 mg PO QPM [facility]


   Last Admin: 01/22/19 17:19 Dose:  40 mg


Metoprolol Tartrate (Lopressor)  100 mg PO BID [facility]


   Last Admin: 01/23/19 09:31 Dose:  Not Given


Mupirocin (Bactroban Ointment)  0 gm TOP DAILY [facility]


   Last Admin: 01/23/19 09:31 Dose:  Not Given


Sevelamer Carbonate (Renvela)  800 mg PO TIDCC [facility]


   Last Admin: 01/23/19 13:43 Dose:  800 mg


Tamsulosin HCl (Flomax)  0.4 mg PO DAILY [facility]


   Last Admin: 01/23/19 13:42 Dose:  0.4 mg


Vitamin B Complex/Vit C/Folic Acid (Nephro-Roxy)  1 tab PO 0800 [facility]


   Last Admin: 01/23/19 08:19 Dose:  1 tab











- Labs


Labs: 


                                        





                                 01/22/19 08:32 





                                 01/22/19 08:32 





                                        











PT  11.3 SECONDS (9.7-12.2)   01/22/19  08:32    


 


INR  1.0   01/22/19  08:32    


 


APTT  28 SECONDS (21-34)  D 01/22/19  08:32

## 2019-01-24 RX ADMIN — INSULIN ASPART SCH: 100 INJECTION, SOLUTION INTRAVENOUS; SUBCUTANEOUS at 22:13

## 2019-01-24 RX ADMIN — FLUTICASONE FUROATE AND VILANTEROL TRIFENATATE SCH PUFF: 200; 25 POWDER RESPIRATORY (INHALATION) at 07:56

## 2019-01-24 RX ADMIN — INSULIN ASPART SCH UNITS: 100 INJECTION, SOLUTION INTRAVENOUS; SUBCUTANEOUS at 08:17

## 2019-01-24 RX ADMIN — INSULIN ASPART SCH: 100 INJECTION, SOLUTION INTRAVENOUS; SUBCUTANEOUS at 17:23

## 2019-01-24 RX ADMIN — Medication SCH TAB: at 08:16

## 2019-01-24 RX ADMIN — INSULIN ASPART SCH UNITS: 100 INJECTION, SOLUTION INTRAVENOUS; SUBCUTANEOUS at 12:15

## 2019-01-24 NOTE — PCM.HF
Heart Failure Core Measure





- Heart Failure


Ejection Fraction: 40 % or Greater (EF 45-50%)


ACE Inhibitor Prescribed: Yes


Beta-Blocker Prescribed: Metoprolol Succinate


Angiotensin II Receptor Blocker Prescribed: No


Contraindication/Reason for not providing: On lisinopril


AnticoagulationTherapy for Atrial Fibrillation/Atrialflutter: No


Contraindication/Reason for not providing: no afib


Aldosterone Antagonist Prescribed: No


Contraindication/Reason for not providing: ESRD


Hydralazine Nitrate Prescribed: Yes


Implantable Cardioverter Defibrillator Therapy: No


Contraindication/Reason for not providing: EF>40%


Cardiac Resynchronization Therapy Prescribed: No


Contraindication/Reason for not providing: not indicated





- Follow up


Will be discharged to: Skilled Nursing Facility ([facility]

## 2019-01-24 NOTE — CP.PCM.PN
Subjective





- Date & Time of Evaluation


Date of Evaluation: 01/24/19


Time of Evaluation: 12:25





- Subjective


Subjective: 





Nephrology Consultation Note:





Assessment: stable


Acute Kidney Injury (N17.9) ? etiology. eval for GN such as ANCA. likely ESRD 


mild hyerkalemia and metabolic acidosis


fluid overload, possible CHF exacerbation EF 45%


Diabetic chronic Kidney Disease (E11.22) 


Hypertensive Chronic Kidney Disease (I12.9)


Chronic Kidney Disease (N18.3) Stage 3 with 8 gram proteinuria (R80.9) likely 

due to HTN/obesity


morbid obesity


hx of cavitary lung lesions in 2016, resolved on follow up Chest Ct





Plan


HD started 1/15/19. plan for HD as TTS considering his outpt schedule


Hypertension control with meds as ordered. Maintain hemodynamics stable. pt 

started on lisinopril


cardiology pulmonary following


started flomax, vit d and phos binders, nephrovite


no plan for biopsy considering renal cortical thinning on usg and morbid 

obesity, CHF and serology for GN negative





Dose meds/antibiotics for reduced GFR. Avoid fleets enema/magnesium based laxati

ves. Avoid nephrotoxins/NSAIDs/ iodinated contrast (unless needed emergently)


Glycemic control


Further work up/management as per primary team


SW consult for outpt HD placement (accepted at [facility] TTS 3rd shift)





pt might be going to [facility] soon. stable for d/c from renal perspective





Thanks for allowing me to participate in care of your patient. Will follow 

patient with you. Please call if any Qs. had d/w team


Dr Anton Mcdaniel


Office: [phone] Cell: [phone] Fax: [phone]





Chief Complaint; SOB


Reason for consult: Acute Kidney Injury


HPI: Pt is a 58 M with hx of diabetes Mellitus ( 5years), hypertension (recent 

years) morbid obesity no regular medical follow up presented with complaints of 

worsening SOB on minimal exertion and on lying down. reported leg swelling. 

symptoms only for last few days as per pt. his serum cr 1.5-1.6 in 2016 also 

with cavitary lesion in lung that time. now cr 5 range


Denies OTC/herbal meds or NSAIDs


No recent iodinated contrast exposure. No obvious episodes of low BP. bP high in

200s when came


denies smoking/etoh or drugs





ROS: 


Cardiovascular: No chest pain. 


Pulmonary: shortness of breath better


Gastrointestinal: denies abdominal pain No nausea. No vomiting. 


Genitourinary: No pain while urinating. Denies blood in urine. 


All other negative except as mentioned in HPI





Physical Examination:      


General Appearance: Comfortable, in no acute respiratory distress, co-operative 

. morbid obese


Vitals reviewed and noted as below


Head; Atraumatic, normocephalic


ENT: no ulcers no thrush. Tongue is midline. Oropharynx: no rash or ulcers.


EYES: Pupils are equal, round and reactive to light accommodation. Eye muscles 

and extraocular movement intact. Sclera is anicteric.


Neck; supple no lymphadenopathy, no thyromegaly or bruit


Lungs: normal respiratory rate/effort. Breath sounds bilateral improved


Heart: Normal rate. s1s2 normal. No rub or gallop. 


Extremities: 1-2+ edema. No varicose veins


Neurological: Patient is alert, awake and oriented to person, place and time. No

focal deficit. Strength bilateral appropriate and equal


Skin: Warm and dry. Normal turgor. No rash. Palpitation: Normal elasticity for 

age


Abdomen: Abdomen is soft. Bowel sounds +. There is no abdominal tenderness, no 

guarding/rigidity no organomegaly


Psych: normal insight and normal affect/mood


MSK: no joint tenderness or swelling. Digits and nails normal, no deformity. 


: kidney or bladder not palpable


access: pc. left AVF placed has thrill and bruit





Labs/imaging reviewed.


Past medical history, past surgical history, family history, social history, 

allergy reviewed and noted as below


Family hx: no hx of CKD. Rest non-contributory 








Objective





- Vital Signs/Intake and Output


Vital Signs (last 24 hours): 


                                        











Temp Pulse Resp BP Pulse Ox


 


 97.8 F   72   20   136/65   97 


 


 01/24/19 07:30  01/24/19 07:30  01/24/19 07:30  01/24/19 09:44  01/24/19 07:30








Intake and Output: 


                                        











 01/24/19 01/24/19





 06:59 18:59


 


Intake Total 400 


 


Balance 400 














- Medications


Medications: 


                               Current Medications





Dextrose (Dextrose 50% Inj)  0 ml IV STAT PRN; Protocol


   PRN Reason: Hypoglycemia Protocol


Dextrose (Glutose 15)  0 gm PO ONCE PRN; Protocol


   PRN Reason: Hypoglycemia Protocol


Epoetin Florentino (Procrit)  8,000 unit IV [facility]


   Last Admin: 01/23/19 10:47 Dose:  8,000 unit


Ergocalciferol (Drisdol 50,000 Intl Units Cap)  1 cap PO Q7D [facility]


   Last Admin: 01/22/19 11:07 Dose:  Not Given


Fluticasone/Vilanterol (Breo Ellipta 200-25 Mcg Inh)  1 puff INH RQD [facility]


   Last Admin: 01/24/19 07:56 Dose:  1 puff


Furosemide (Lasix)  80 mg PO TTS [facility]


   Last Admin: 01/24/19 09:44 Dose:  80 mg


Glucagon (Glucagen Diagnostic Kit)  0 mg IM STAT PRN; Protocol


   PRN Reason: Hypoglycemia Protocol


Hydralazine HCl (Apresoline)  50 mg PO BID [facility]


   Last Admin: 01/24/19 09:45 Dose:  50 mg


Insulin Aspart (Novolog)  0 unit SC ACHS [facility]; Protocol


   Last Admin: 01/24/19 12:15 Dose:  3 units


Lisinopril (Zestril)  40 mg PO QPM [facility]


   Last Admin: 01/23/19 22:28 Dose:  40 mg


Metoprolol Tartrate (Lopressor)  100 mg PO BID [facility]


   Last Admin: 01/24/19 09:45 Dose:  100 mg


Mupirocin (Bactroban Ointment)  0 gm TOP DAILY [facility]


   Last Admin: 01/23/19 09:31 Dose:  Not Given


Sevelamer Carbonate (Renvela)  800 mg PO TIDCC [facility]


   Last Admin: 01/24/19 12:15 Dose:  800 mg


Tamsulosin HCl (Flomax)  0.4 mg PO DAILY [facility]


   Last Admin: 01/24/19 09:45 Dose:  0.4 mg


Vitamin B Complex/Vit C/Folic Acid (Nephro-Roxy)  1 tab PO 0800 [facility]


   Last Admin: 01/24/19 08:16 Dose:  1 tab











- Labs


Labs: 


                                        





                                 01/22/19 08:32 





                                 01/22/19 08:32 





                                        











PT  11.3 SECONDS (9.7-12.2)   01/22/19  08:32    


 


INR  1.0   01/22/19  08:32    


 


APTT  28 SECONDS (21-34)  D 01/22/19  08:32

## 2019-01-24 NOTE — CP.PCM.PN
Subjective





- Date & Time of Evaluation


Date of Evaluation: 01/24/19


Time of Evaluation: 14:59





Objective





- Vital Signs/Intake and Output


Vital Signs (last 24 hours): 


                                        











Temp Pulse Resp BP Pulse Ox


 


 97.7 F   68   19   179/94 H  97 


 


 01/24/19 13:30  01/24/19 13:30  01/24/19 13:30  01/24/19 14:30  01/24/19 07:30








Intake and Output: 


                                        











 01/24/19 01/24/19





 06:59 18:59


 


Intake Total 400 


 


Balance 400 














- Medications


Medications: 


                               Current Medications





Dextrose (Dextrose 50% Inj)  0 ml IV STAT PRN; Protocol


   PRN Reason: Hypoglycemia Protocol


Dextrose (Glutose 15)  0 gm PO ONCE PRN; Protocol


   PRN Reason: Hypoglycemia Protocol


Epoetin Florentino (Procrit)  8,000 unit IV TTS Pending sale to [facility]


Ergocalciferol (Drisdol 50,000 Intl Units Cap)  1 cap PO Q7D Pending sale to [facility]


   Last Admin: 01/22/19 11:07 Dose:  Not Given


Fluticasone/Vilanterol (Breo Ellipta 200-25 Mcg Inh)  1 puff INH RQD Pending sale to [facility]


   Last Admin: 01/24/19 07:56 Dose:  1 puff


Furosemide (Lasix)  80 mg PO MWF Pending sale to [facility]


Glucagon (Glucagen Diagnostic Kit)  0 mg IM STAT PRN; Protocol


   PRN Reason: Hypoglycemia Protocol


Hydralazine HCl (Apresoline)  50 mg PO BID Pending sale to [facility]


   Last Admin: 01/24/19 09:45 Dose:  50 mg


Insulin Aspart (Novolog)  0 unit SC ACHS Pending sale to [facility]; Protocol


   Last Admin: 01/24/19 12:15 Dose:  3 units


Lisinopril (Zestril)  40 mg PO QPM Pending sale to [facility]


   Last Admin: 01/23/19 22:28 Dose:  40 mg


Metoprolol Tartrate (Lopressor)  100 mg PO BID Pending sale to [facility]


   Last Admin: 01/24/19 09:45 Dose:  100 mg


Mupirocin (Bactroban Ointment)  0 gm TOP DAILY Pending sale to [facility]


   Last Admin: 01/24/19 13:05 Dose:  Not Given


Sevelamer Carbonate (Renvela)  800 mg PO TIDCC Pending sale to [facility]


   Last Admin: 01/24/19 12:15 Dose:  800 mg


Tamsulosin HCl (Flomax)  0.4 mg PO DAILY Pending sale to [facility]


   Last Admin: 01/24/19 09:45 Dose:  0.4 mg


Vitamin B Complex/Vit C/Folic Acid (Nephro-Roxy)  1 tab PO 0800 [facility]


   Last Admin: 01/24/19 08:16 Dose:  1 tab











- Labs


Labs: 


                                        





                                 01/22/19 08:32 





                                 01/22/19 08:32 





                                        











PT  11.3 SECONDS (9.7-12.2)   01/22/19  08:32    


 


INR  1.0   01/22/19  08:32    


 


APTT  28 SECONDS (21-34)  D 01/22/19  08:32    














Assessment and Plan


(1) ESRD (end stage renal disease)


Status: Acute   





(2) Heart failure


Status: Acute   





(3) Diabetes


Status: Chronic   





(4) Hypertension


Status: Chronic   





(5) Hypertensive emergency


Status: Acute

## 2019-01-24 NOTE — CP.PCM.PN
Subjective





- Date & Time of Evaluation


Date of Evaluation: 01/24/19


Time of Evaluation: 09:45





- Subjective


Subjective: 


clinically same





Objective





- Vital Signs/Intake and Output


Vital Signs (last 24 hours): 


                                        











Temp Pulse Resp BP Pulse Ox


 


 97.7 F   68   19   129/73   97 


 


 01/24/19 13:30  01/24/19 13:30  01/24/19 13:30  01/24/19 16:00  01/24/19 07:30








Intake and Output: 


                                        











 01/24/19 01/24/19





 06:59 18:59


 


Intake Total 400 600


 


Balance 400 600














- Medications


Medications: 


                               Current Medications





Dextrose (Dextrose 50% Inj)  0 ml IV STAT PRN; Protocol


   PRN Reason: Hypoglycemia Protocol


Dextrose (Glutose 15)  0 gm PO ONCE PRN; Protocol


   PRN Reason: Hypoglycemia Protocol


Epoetin Florentino (Procrit)  8,000 unit IV TTS [facility]


Ergocalciferol (Drisdol 50,000 Intl Units Cap)  1 cap PO Q7D [facility]


   Last Admin: 01/22/19 11:07 Dose:  Not Given


Fluticasone/Vilanterol (Breo Ellipta 200-25 Mcg Inh)  1 puff INH RQD [facility]


   Last Admin: 01/24/19 07:56 Dose:  1 puff


Furosemide (Lasix)  80 mg PO MWF [facility]


Glucagon (Glucagen Diagnostic Kit)  0 mg IM STAT PRN; Protocol


   PRN Reason: Hypoglycemia Protocol


Hydralazine HCl (Apresoline)  50 mg PO BID [facility]


   Last Admin: 01/24/19 09:45 Dose:  50 mg


Insulin Aspart (Novolog)  0 unit SC ACHS [facility]; Protocol


   Last Admin: 01/24/19 12:15 Dose:  3 units


Lisinopril (Zestril)  40 mg PO QPM [facility]


   Last Admin: 01/23/19 22:28 Dose:  40 mg


Metoprolol Tartrate (Lopressor)  100 mg PO BID [facility]


   Last Admin: 01/24/19 09:45 Dose:  100 mg


Mupirocin (Bactroban Ointment)  0 gm TOP DAILY [facility]


   Last Admin: 01/24/19 13:05 Dose:  Not Given


Sevelamer Carbonate (Renvela)  800 mg PO TIDCC [facility]


   Last Admin: 01/24/19 12:15 Dose:  800 mg


Tamsulosin HCl (Flomax)  0.4 mg PO DAILY [facility]


   Last Admin: 01/24/19 09:45 Dose:  0.4 mg


Vitamin B Complex/Vit C/Folic Acid (Nephro-Roxy)  1 tab PO 0800 [facility]


   Last Admin: 01/24/19 08:16 Dose:  1 tab











- Labs


Labs: 


                                        





                                 01/22/19 08:32 





                                 01/22/19 08:32 





                                        











PT  11.3 SECONDS (9.7-12.2)   01/22/19  08:32    


 


INR  1.0   01/22/19  08:32    


 


APTT  28 SECONDS (21-34)  D 01/22/19  08:32    














- Constitutional


Appears: Well





- Head Exam


Head Exam: ATRAUMATIC, NORMAL INSPECTION, NORMOCEPHALIC





- Eye Exam


Eye Exam: EOMI, Normal appearance, PERRL


Pupil Exam: NORMAL ACCOMODATION, PERRL





- ENT Exam


ENT Exam: Mucous Membranes Moist, Normal Exam





- Neck Exam


Neck Exam: Full ROM, Normal Inspection.  absent: Lymphadenopathy





- Respiratory Exam


Respiratory Exam: Decreased Breath Sounds





- Cardiovascular Exam


Cardiovascular Exam: REGULAR RHYTHM, +S1, +S2





- GI/Abdominal Exam


GI & Abdominal Exam: Soft, Diminished Bowel Sounds





- Rectal Exam


Rectal Exam: Deferred

## 2019-01-25 VITALS — TEMPERATURE: 98.1 F

## 2019-01-25 VITALS — OXYGEN SATURATION: 98 % | RESPIRATION RATE: 20 BRPM

## 2019-01-25 VITALS — HEART RATE: 69 BPM | SYSTOLIC BLOOD PRESSURE: 150 MMHG | DIASTOLIC BLOOD PRESSURE: 75 MMHG

## 2019-01-25 RX ADMIN — Medication SCH TAB: at 08:31

## 2019-01-25 RX ADMIN — INSULIN ASPART SCH UNITS: 100 INJECTION, SOLUTION INTRAVENOUS; SUBCUTANEOUS at 17:22

## 2019-01-25 RX ADMIN — INSULIN ASPART SCH UNITS: 100 INJECTION, SOLUTION INTRAVENOUS; SUBCUTANEOUS at 12:30

## 2019-01-25 RX ADMIN — FLUTICASONE FUROATE AND VILANTEROL TRIFENATATE SCH PUFF: 200; 25 POWDER RESPIRATORY (INHALATION) at 10:38

## 2019-01-25 RX ADMIN — INSULIN ASPART SCH: 100 INJECTION, SOLUTION INTRAVENOUS; SUBCUTANEOUS at 08:23

## 2019-01-25 NOTE — CP.PCM.PN
Subjective





- Date & Time of Evaluation


Date of Evaluation: 01/25/19


Time of Evaluation: 17:45





Objective





- Vital Signs/Intake and Output


Vital Signs (last 24 hours): 


                                        











Temp Pulse Resp BP Pulse Ox


 


 98.1 F   69   20   150/75   98 


 


 01/25/19 16:00  01/25/19 17:25  01/25/19 16:00  01/25/19 17:25  01/25/19 16:00








Intake and Output: 


                                        











 01/25/19 01/25/19





 06:59 18:59


 


Intake Total  600


 


Balance  600














- Medications


Medications: 


                               Current Medications





Dextrose (Dextrose 50% Inj)  0 ml IV STAT PRN; Protocol


   PRN Reason: Hypoglycemia Protocol


Dextrose (Glutose 15)  0 gm PO ONCE PRN; Protocol


   PRN Reason: Hypoglycemia Protocol


Epoetin Florentino (Procrit)  8,000 unit IV TTS [facility]


Ergocalciferol (Drisdol 50,000 Intl Units Cap)  1 cap PO Q7D [facility]


   Last Admin: 01/22/19 11:07 Dose:  Not Given


Fluticasone/Vilanterol (Breo Ellipta 200-25 Mcg Inh)  1 puff INH RQD [facility]


   Last Admin: 01/25/19 10:38 Dose:  1 puff


Furosemide (Lasix)  80 mg PO MWF [facility]


   Last Admin: 01/25/19 10:03 Dose:  80 mg


Glucagon (Glucagen Diagnostic Kit)  0 mg IM STAT PRN; Protocol


   PRN Reason: Hypoglycemia Protocol


Hydralazine HCl (Apresoline)  50 mg PO BID [facility]


   Last Admin: 01/25/19 17:22 Dose:  50 mg


Insulin Aspart (Novolog)  0 unit SC ACHS [facility]; Protocol


   Last Admin: 01/25/19 17:22 Dose:  2 units


Lisinopril (Zestril)  40 mg PO QPM [facility]


   Last Admin: 01/25/19 17:23 Dose:  40 mg


Metoprolol Tartrate (Lopressor)  100 mg PO BID [facility]


   Last Admin: 01/25/19 17:23 Dose:  100 mg


Mupirocin (Bactroban Ointment)  0 gm TOP DAILY [facility]


   Last Admin: 01/25/19 10:06 Dose:  1 applic


Sevelamer Carbonate (Renvela)  800 mg PO TIDCC [facility]


   Last Admin: 01/25/19 17:22 Dose:  800 mg


Tamsulosin HCl (Flomax)  0.4 mg PO DAILY [facility]


   Last Admin: 01/25/19 10:02 Dose:  0.4 mg


Vitamin B Complex/Vit C/Folic Acid (Nephro-Roxy)  1 tab PO 0800 [facility]


   Last Admin: 01/25/19 08:31 Dose:  1 tab











- Labs


Labs: 


                                        





                                 01/22/19 08:32 





                                 01/22/19 08:32 





                                        











PT  11.3 SECONDS (9.7-12.2)   01/22/19  08:32    


 


INR  1.0   01/22/19  08:32    


 


APTT  28 SECONDS (21-34)  D 01/22/19  08:32    














Assessment and Plan


(1) ESRD (end stage renal disease)


Status: Acute   





(2) Heart failure


Status: Acute   





(3) Diabetes


Status: Chronic   





(4) Hypertension


Status: Chronic   





(5) Hypertensive emergency


Status: Acute

## 2019-01-25 NOTE — CP.PCM.PN
Subjective





- Date & Time of Evaluation


Date of Evaluation: 01/25/19


Time of Evaluation: 14:25





- Subjective


Subjective: 





Nephrology Consultation Note:





Assessment: stable


Acute Kidney Injury (N17.9) ? etiology. eval for GN such as ANCA. likely ESRD 


mild hyerkalemia and metabolic acidosis


fluid overload, possible CHF exacerbation EF 45%


Diabetic chronic Kidney Disease (E11.22) 


Hypertensive Chronic Kidney Disease (I12.9)


Chronic Kidney Disease (N18.3) Stage 3 with 8 gram proteinuria (R80.9) likely 

due to HTN/obesity


morbid obesity


hx of cavitary lung lesions in 2016, resolved on follow up Chest Ct





Plan


HD started 1/15/19. plan for HD as TTS considering his outpt schedule


Hypertension control with meds as ordered. Maintain hemodynamics stable. pt 

started on lisinopril


cardiology pulmonary following


started flomax, vit d and phos binders, nephrovite


no plan for biopsy considering renal cortical thinning on usg and morbid 

obesity, CHF and serology for GN negative





Dose meds/antibiotics for reduced GFR. Avoid fleets enema/magnesium based laxati

ves. Avoid nephrotoxins/NSAIDs/ iodinated contrast (unless needed emergently)


Glycemic control


Further work up/management as per primary team


SW consult for outpt HD placement (accepted at [facility] TTS 3rd shift)





pt might be going to [facility] soon. stable for d/c from renal perspective





Thanks for allowing me to participate in care of your patient. Will follow 

patient with you. Please call if any Qs. had d/w team


Dr Anton Mcdaniel


Office: [phone] Cell: [phone] Fax: [phone]





Chief Complaint; SOB


Reason for consult: Acute Kidney Injury


HPI: Pt is a 58 M with hx of diabetes Mellitus ( 5years), hypertension (recent 

years) morbid obesity no regular medical follow up presented with complaints of 

worsening SOB on minimal exertion and on lying down. reported leg swelling. 

symptoms only for last few days as per pt. his serum cr 1.5-1.6 in 2016 also 

with cavitary lesion in lung that time. now cr 5 range


Denies OTC/herbal meds or NSAIDs


No recent iodinated contrast exposure. No obvious episodes of low BP. bP high in

200s when came


denies smoking/etoh or drugs





ROS: 


Cardiovascular: No chest pain. 


Pulmonary: shortness of breath better


Gastrointestinal: denies abdominal pain No nausea. No vomiting. 


Genitourinary: No pain while urinating. Denies blood in urine. 


All other negative except as mentioned in HPI





Physical Examination:      


General Appearance: Comfortable, in no acute respiratory distress, co-operative 

. morbid obese


Vitals reviewed and noted as below


Head; Atraumatic, normocephalic


ENT: no ulcers no thrush. Tongue is midline. Oropharynx: no rash or ulcers.


EYES: Pupils are equal, round and reactive to light accommodation. Eye muscles 

and extraocular movement intact. Sclera is anicteric.


Neck; supple no lymphadenopathy, no thyromegaly or bruit


Lungs: normal respiratory rate/effort. Breath sounds bilateral improved


Heart: Normal rate. s1s2 normal. No rub or gallop. 


Extremities: 1-2+ edema. No varicose veins


Neurological: Patient is alert, awake and oriented to person, place and time. No

focal deficit. Strength bilateral appropriate and equal


Skin: Warm and dry. Normal turgor. No rash. Palpitation: Normal elasticity for 

age


Abdomen: Abdomen is soft. Bowel sounds +. There is no abdominal tenderness, no 

guarding/rigidity no organomegaly


Psych: normal insight and normal affect/mood


MSK: no joint tenderness or swelling. Digits and nails normal, no deformity. 


: kidney or bladder not palpable


access: pc. left AVF placed has thrill and bruit





Labs/imaging reviewed.


Past medical history, past surgical history, family history, social history, 

allergy reviewed and noted as below


Family hx: no hx of CKD. Rest non-contributory 








Objective





- Vital Signs/Intake and Output


Vital Signs (last 24 hours): 


                                        











Temp Pulse Resp BP Pulse Ox


 


 98.4 F   66   20   163/80 H  98 


 


 01/25/19 07:10  01/25/19 07:10  01/25/19 07:10  01/25/19 10:03  01/25/19 07:10











- Medications


Medications: 


                               Current Medications





Dextrose (Dextrose 50% Inj)  0 ml IV STAT PRN; Protocol


   PRN Reason: Hypoglycemia Protocol


Dextrose (Glutose 15)  0 gm PO ONCE PRN; Protocol


   PRN Reason: Hypoglycemia Protocol


Epoetin Florentino (Procrit)  8,000 unit IV TTS [facility]


Ergocalciferol (Drisdol 50,000 Intl Units Cap)  1 cap PO Q7D [facility]


   Last Admin: 01/22/19 11:07 Dose:  Not Given


Fluticasone/Vilanterol (Breo Ellipta 200-25 Mcg Inh)  1 puff INH RQD [facility]


   Last Admin: 01/25/19 10:38 Dose:  1 puff


Furosemide (Lasix)  80 mg PO MWF [facility]


   Last Admin: 01/25/19 10:03 Dose:  80 mg


Glucagon (Glucagen Diagnostic Kit)  0 mg IM STAT PRN; Protocol


   PRN Reason: Hypoglycemia Protocol


Hydralazine HCl (Apresoline)  50 mg PO BID [facility]


   Last Admin: 01/25/19 10:02 Dose:  50 mg


Insulin Aspart (Novolog)  0 unit SC ACHS [facility]; Protocol


   Last Admin: 01/25/19 12:30 Dose:  3 units


Lisinopril (Zestril)  40 mg PO QPM [facility]


   Last Admin: 01/24/19 18:28 Dose:  Not Given


Metoprolol Tartrate (Lopressor)  100 mg PO BID [facility]


   Last Admin: 01/25/19 10:02 Dose:  100 mg


Mupirocin (Bactroban Ointment)  0 gm TOP DAILY [facility]


   Last Admin: 01/25/19 10:06 Dose:  1 applic


Sevelamer Carbonate (Renvela)  800 mg PO TIDCC [facility]


   Last Admin: 01/25/19 12:29 Dose:  800 mg


Tamsulosin HCl (Flomax)  0.4 mg PO DAILY [facility]


   Last Admin: 01/25/19 10:02 Dose:  0.4 mg


Vitamin B Complex/Vit C/Folic Acid (Nephro-Roxy)  1 tab PO 0800 [facility]


   Last Admin: 01/25/19 08:31 Dose:  1 tab











- Labs


Labs: 


                                        





                                 01/22/19 08:32 





                                 01/22/19 08:32 





                                        











PT  11.3 SECONDS (9.7-12.2)   01/22/19  08:32    


 


INR  1.0   01/22/19  08:32    


 


APTT  28 SECONDS (21-34)  D 01/22/19  08:32

## 2019-02-19 ENCOUNTER — HOSPITAL ENCOUNTER (EMERGENCY)
Dept: HOSPITAL 31 - C.ER | Age: 59
LOS: 1 days | Discharge: HOME | End: 2019-02-20
Payer: MEDICAID

## 2019-02-19 DIAGNOSIS — J44.9: ICD-10-CM

## 2019-02-19 DIAGNOSIS — R42: Primary | ICD-10-CM

## 2019-02-19 DIAGNOSIS — Z99.2: ICD-10-CM

## 2019-02-19 DIAGNOSIS — I12.0: ICD-10-CM

## 2019-02-19 DIAGNOSIS — N18.6: ICD-10-CM

## 2019-02-19 DIAGNOSIS — I50.9: ICD-10-CM

## 2019-02-19 LAB
ALBUMIN SERPL-MCNC: 4.4 G/DL (ref 3.5–5)
ALBUMIN/GLOB SERPL: 1.2 {RATIO} (ref 1–2.1)
ALT SERPL-CCNC: 18 U/L (ref 21–72)
AST SERPL-CCNC: 32 U/L (ref 17–59)
BASOPHILS # BLD AUTO: 0 K/UL (ref 0–0.2)
BASOPHILS NFR BLD: 0.4 % (ref 0–2)
BUN SERPL-MCNC: 25 MG/DL (ref 9–20)
CALCIUM SERPL-MCNC: 8.9 MG/DL (ref 8.6–10.4)
EOSINOPHIL # BLD AUTO: 0.3 K/UL (ref 0–0.7)
EOSINOPHIL NFR BLD: 2.4 % (ref 0–4)
ERYTHROCYTE [DISTWIDTH] IN BLOOD BY AUTOMATED COUNT: 14.9 % (ref 11.5–14.5)
GFR NON-AFRICAN AMERICAN: 14
HGB BLD-MCNC: 12.3 G/DL (ref 12–18)
LYMPHOCYTES # BLD AUTO: 1.9 K/UL (ref 1–4.3)
LYMPHOCYTES NFR BLD AUTO: 17.3 % (ref 20–40)
MCH RBC QN AUTO: 30.5 PG (ref 27–31)
MCHC RBC AUTO-ENTMCNC: 33.8 G/DL (ref 33–37)
MCV RBC AUTO: 90.4 FL (ref 80–94)
MONOCYTES # BLD: 0.5 K/UL (ref 0–0.8)
MONOCYTES NFR BLD: 4.2 % (ref 0–10)
NEUTROPHILS # BLD: 8.3 K/UL (ref 1.8–7)
NEUTROPHILS NFR BLD AUTO: 75.7 % (ref 50–75)
NRBC BLD AUTO-RTO: 0.1 % (ref 0–2)
PLATELET # BLD: 244 K/UL (ref 130–400)
PMV BLD AUTO: 7.4 FL (ref 7.2–11.7)
RBC # BLD AUTO: 4.02 MIL/UL (ref 4.4–5.9)
TROPONIN I SERPL-MCNC: 0.05 NG/ML (ref 0–0.12)
WBC # BLD AUTO: 10.9 K/UL (ref 4.8–10.8)

## 2019-02-19 NOTE — C.PDOC
History Of Present Illness


58 year old male brought to ED via BLS after experiencing an episode of light 

headedness. Patient reports "I had a a dizzy spell when I got up after 

dialysis." Patient attends dialysis every Tuesday, Thursday, and Saturday. He 

completed his round of dialysis today. Patient denies chest pain, palpitations, 

weakness, nausea, vomiting, fall, syncope, headache, and vision changes. 





Chief Complaint (Nursing): Dizziness/Lightheaded


History Per: Patient


History/Exam Limitations: no limitations


Onset/Duration Of Symptoms: Hrs


Current Symptoms Are (Timing): Still Present


Activity At Onset Of Symptoms: Had Just Stood up


Possible Causative Factor(s): Lightheaded W/Standing





Past Medical History


Reviewed: Historical Data, Nursing Documentation, Vital Signs


Vital Signs: 





                                Last Vital Signs











Temp  97.5 F L  19 19:01


 


Pulse  97 H  19 19:01


 


Resp  20   19 19:01


 


BP  163/96 H  19 19:01


 


Pulse Ox  92 L  19 19:01














- Medical History


PMH: Anemia, Bronchitis, CHF, COPD, HTN, Chronic Kidney Disease


Surgical History: No Surg Hx





- [facility] Procedures











 (19)


BYPASS LEFT RADIAL ARTERY TO LOWER ARM VEIN, OPEN APPROACH (19)


INSERTION OF INFUSION DEV INTO SUP VENA CAVA, PERC APPROACH (19)








Family History: States: Unknown Family Hx





- Social History


Hx Alcohol Use: No


Hx Substance Use: No





- Immunization History


Hx Tetanus Toxoid Vaccination: No


Hx Influenza Vaccination: No


Hx Pneumococcal Vaccination: No





Review Of Systems


Constitutional: Negative for: Fever, Chills, Weakness


Eyes: Negative for: Vision Change


Cardiovascular: Positive for: Light Headedness.  Negative for: Chest Pain, 

Palpitations


Respiratory: Negative for: Cough, Shortness of Breath


Gastrointestinal: Negative for: Nausea, Vomiting


Neurological: Negative for: Weakness, Numbness, Headache, Dizziness





Physical Exam





- Physical Exam


Appears: Well, Non-toxic, No Acute Distress


Skin: Normal Color, Warm, Dry


Head: Atraumatic, Normacephalic


Neck: Normal ROM, Supple


Chest: Symmetrical, No Deformity


Cardiovascular: Rhythm Regular, No Murmur


Respiratory: No Accessory Muscle Use


Gastrointestinal/Abdominal: Soft, No Tenderness


Extremity: Capillary Refill (<2 seconds)


Extremity: Bilateral: Atraumatic, Normal Color And Temperature


Pulses: Left Radial: Normal, Right Radial: Normal


Neurological/Psych: Oriented x3, Normal Speech, Normal Cognition, Normal 

Sensation





ED Course And Treatment





- Laboratory Results


Result Diagrams: 


                                 19 19:15





                                 19 19:15


ECG: Interpreted By Me, Viewed By Me


ECG Rhythm: Sinus Rhythm, R BBB


ECG Interpretation: No Acute Changes, Abnormal


Interpretation Of ECG: NSR, LAD, RBBB, no acute changes, abnormal tracings.


Rate From EC


O2 Sat by Pulse Oximetry: 92


Progress Note: EKG ordered for patient.  Labs ordered for patient.  Patient 

Novolog SC.





Disposition


Counseled Patient/Family Regarding: Diagnosis





- Disposition


Referrals: 


Shaik Subramanian MD [Staff Provider] - 


[facility] [Outside]


Disposition: HOME/ ROUTINE


Disposition Time: 20:29


Condition: STABLE


Instructions:  Dizziness, Nonvertigo, (DC), End Stage Kidney Disease, 

Hemodialysis


Forms:  [facility] (English)





- POA


Present On Arrival: None





- Clinical Impression


Clinical Impression: 


 Dizziness, ESRD (end stage renal disease), Diabetes mellitus








- Scribe Statement


The provider has reviewed the documentation as recorded by the Scribe (Sherrell Juarez)








All medical record entries made by the Scribe were at my direction and 

personally dictated by me. I have reviewed the chart and agree that the record 

accurately reflects my personal performance of the history, physical exam, 

medical decision making, and the department course for this patient. I have also

 personally directed, reviewed, and agree with the discharge instructions and 

disposition.

## 2019-02-20 VITALS
TEMPERATURE: 98 F | SYSTOLIC BLOOD PRESSURE: 148 MMHG | HEART RATE: 86 BPM | DIASTOLIC BLOOD PRESSURE: 84 MMHG | RESPIRATION RATE: 20 BRPM

## 2019-02-20 VITALS — OXYGEN SATURATION: 97 %

## 2019-02-20 NOTE — CARD
--------------- APPROVED REPORT --------------





Date of service: 02/19/2019



EKG Measurement

Heart [id number]

NH [id number]

[id number]

[id number]

[id number]



<Conclusion>

Normal sinus rhythm

Left axis deviation

Incomplete right bundle branch block

ST & T wave abnormality, consider lateral ischemia

Prolonged QT

Abnormal ECG